# Patient Record
Sex: MALE | Race: WHITE
[De-identification: names, ages, dates, MRNs, and addresses within clinical notes are randomized per-mention and may not be internally consistent; named-entity substitution may affect disease eponyms.]

---

## 2017-12-21 ENCOUNTER — HOSPITAL ENCOUNTER (EMERGENCY)
Dept: HOSPITAL 88 - ER | Age: 32
Discharge: LEFT BEFORE DISCHARGE | End: 2017-12-21
Payer: SELF-PAY

## 2017-12-21 VITALS — WEIGHT: 180 LBS | BODY MASS INDEX: 27.28 KG/M2 | HEIGHT: 68 IN

## 2017-12-21 DIAGNOSIS — G40.409: Primary | ICD-10-CM

## 2017-12-21 LAB
ALBUMIN SERPL-MCNC: 3.9 G/DL (ref 3.5–5)
ALBUMIN/GLOB SERPL: 1 {RATIO} (ref 0.8–2)
ALP SERPL-CCNC: 97 IU/L (ref 40–150)
ALT SERPL-CCNC: 23 IU/L (ref 0–55)
AMPHETAMINES UR QL SCN>1000 NG/ML: NEGATIVE
ANION GAP SERPL CALC-SCNC: 12.1 MMOL/L (ref 8–16)
BACTERIA URNS QL MICRO: (no result) /HPF
BASOPHILS # BLD AUTO: 0 10*3/UL (ref 0–0.1)
BASOPHILS NFR BLD AUTO: 0.2 % (ref 0–1)
BENZODIAZ UR QL SCN: NEGATIVE
BILIRUB UR QL: NEGATIVE
BUN SERPL-MCNC: 8 MG/DL (ref 7–26)
BUN/CREAT SERPL: 9 (ref 6–25)
CALCIUM SERPL-MCNC: 9.5 MG/DL (ref 8.4–10.2)
CANNABINOIDS UR QL SCN: NEGATIVE
CHLORIDE SERPL-SCNC: 106 MMOL/L (ref 98–107)
CLARITY UR: CLEAR
CO2 SERPL-SCNC: 25 MMOL/L (ref 22–29)
COLOR UR: YELLOW
DEPRECATED NEUTROPHILS # BLD AUTO: 4.4 10*3/UL (ref 2.1–6.9)
DEPRECATED RBC URNS MANUAL-ACNC: (no result) /HPF (ref 0–5)
EGFRCR SERPLBLD CKD-EPI 2021: > 60 ML/MIN (ref 60–?)
EOSINOPHIL # BLD AUTO: 0 10*3/UL (ref 0–0.4)
EOSINOPHIL NFR BLD AUTO: 0.5 % (ref 0–6)
EPI CELLS URNS QL MICRO: (no result) /LPF
ERYTHROCYTE [DISTWIDTH] IN CORD BLOOD: 13.5 % (ref 11.7–14.4)
GLOBULIN PLAS-MCNC: 3.8 G/DL (ref 2.3–3.5)
GLUCOSE SERPLBLD-MCNC: 88 MG/DL (ref 74–118)
HCT VFR BLD AUTO: 47.3 % (ref 38.2–49.6)
HGB BLD-MCNC: 15.8 G/DL (ref 14–18)
KETONES UR QL STRIP.AUTO: NEGATIVE
LEUKOCYTE ESTERASE UR QL STRIP.AUTO: NEGATIVE
LYMPHOCYTES # BLD: 2.7 10*3/UL (ref 1–3.2)
LYMPHOCYTES NFR BLD AUTO: 32.4 % (ref 18–39.1)
MCH RBC QN AUTO: 28.4 PG (ref 28–32)
MCHC RBC AUTO-ENTMCNC: 33.4 G/DL (ref 31–35)
MCV RBC AUTO: 84.9 FL (ref 81–99)
MONOCYTES # BLD AUTO: 1.1 10*3/UL (ref 0.2–0.8)
MONOCYTES NFR BLD AUTO: 12.8 % (ref 4.4–11.3)
NEUTS SEG NFR BLD AUTO: 53.9 % (ref 38.7–80)
NITRITE UR QL STRIP.AUTO: NEGATIVE
PCP UR QL SCN: NEGATIVE
PLATELET # BLD AUTO: 276 X10E3/UL (ref 140–360)
POTASSIUM SERPL-SCNC: 4.1 MMOL/L (ref 3.5–5.1)
PROT UR QL STRIP.AUTO: NEGATIVE
RBC # BLD AUTO: 5.57 X10E6/UL (ref 4.3–5.7)
SODIUM SERPL-SCNC: 139 MMOL/L (ref 136–145)
SP GR UR STRIP: 1.01 (ref 1.01–1.02)
UROBILINOGEN UR STRIP-MCNC: 0.2 MG/DL (ref 0.2–1)
VALPROATE SERPL-MCNC: < 2 UG/ML (ref 50–100)
WBC #/AREA URNS HPF: (no result) /HPF (ref 0–5)

## 2017-12-21 PROCEDURE — 72125 CT NECK SPINE W/O DYE: CPT

## 2017-12-21 PROCEDURE — 99284 EMERGENCY DEPT VISIT MOD MDM: CPT

## 2017-12-21 PROCEDURE — 80307 DRUG TEST PRSMV CHEM ANLYZR: CPT

## 2017-12-21 PROCEDURE — 70450 CT HEAD/BRAIN W/O DYE: CPT

## 2017-12-21 PROCEDURE — 36415 COLL VENOUS BLD VENIPUNCTURE: CPT

## 2017-12-21 PROCEDURE — 80053 COMPREHEN METABOLIC PANEL: CPT

## 2017-12-21 PROCEDURE — 85025 COMPLETE CBC W/AUTO DIFF WBC: CPT

## 2017-12-21 PROCEDURE — 81001 URINALYSIS AUTO W/SCOPE: CPT

## 2017-12-21 PROCEDURE — 82542 COL CHROMOTOGRAPHY QUAL/QUAN: CPT

## 2017-12-21 PROCEDURE — 80164 ASSAY DIPROPYLACETIC ACD TOT: CPT

## 2017-12-21 PROCEDURE — 87086 URINE CULTURE/COLONY COUNT: CPT

## 2017-12-21 NOTE — DIAGNOSTIC IMAGING REPORT
History: Fall

Comparison studies: None



Technique: 

Axial images were obtained through the cervical region..

Coronal and sagittal images reconstructed from the axial data..

Intravenous contrast: None



Findings:



Airway: Patent.



Fractures: None.

Soft tissues: No gross abnormalities.



Atlantoaxial articulation: Intact.

Alignment: Normal lordosis. No scoliosis.

Cervicomedullary junction: No abnormalities. The foramen magnum is patent.



Vertebrae: 

No infection or neoplasm.



Degenerative changes: 

None.



IMPRESSION:



1. No acute abnormalities.



2. Ligament, spinal cord and or vascular abnormalities cannot be excluded on

the basis of this examination.





A preliminary report was given by Neuroradiology fellow Dr. Blackburn at 6:14 PM

on 12/21/2017.



I have reviewed the study and agree with the findings in the preliminary

report.



Signed by: Dr. Mary Kim M.D. on 12/21/2017 7:55 PM

## 2017-12-21 NOTE — DIAGNOSTIC IMAGING REPORT
History: Fall

Comparison studies: CT brain from 11/17/2015 and 12/30/2014.





Technique: 

Axial images were obtained from the skull base to the vertex.  

Coronal and sagittal reconstructions obtained from the axial data.



Findings:



Scalp/skull: 

No abnormalities. No fractures, blastic or lytic lesions.



Extra-axial spaces: 

No masses.  No fluid collections.



Brain sulci: Appropriate for age.

Ventricles: Normal in size and configuration. No hydrocephalus.



Parenchyma: 

No abnormal densities. 

No masses, hemorrhage, acute or chronic cortical vascular insults.



Sellar/suprasellar region: No abnormalities

Craniocervical junction: Patent foramen magnum.  No Chiari one malformation.



IMPRESSION:



No intracranial abnormalities.



A preliminary report was given by Neuroradiology fellow Dr. Blackburn at 6:11 PM

on 12/21/2017.



I have reviewed the study and agree with the findings in the preliminary

report.



Signed by: Dr. Mary Kim M.D. on 12/21/2017 7:52 PM

## 2019-07-06 ENCOUNTER — HOSPITAL ENCOUNTER (EMERGENCY)
Dept: HOSPITAL 88 - FSED | Age: 34
Discharge: HOME | End: 2019-07-06
Payer: SELF-PAY

## 2019-07-06 VITALS — BODY MASS INDEX: 26.43 KG/M2 | WEIGHT: 178.44 LBS | HEIGHT: 69 IN

## 2019-07-06 VITALS — DIASTOLIC BLOOD PRESSURE: 74 MMHG | SYSTOLIC BLOOD PRESSURE: 138 MMHG

## 2019-07-06 DIAGNOSIS — F17.210: ICD-10-CM

## 2019-07-06 DIAGNOSIS — R10.13: Primary | ICD-10-CM

## 2019-07-06 DIAGNOSIS — R11.0: ICD-10-CM

## 2019-07-06 PROCEDURE — 80053 COMPREHEN METABOLIC PANEL: CPT

## 2019-07-06 PROCEDURE — 81003 URINALYSIS AUTO W/O SCOPE: CPT

## 2019-07-06 PROCEDURE — 71046 X-RAY EXAM CHEST 2 VIEWS: CPT

## 2019-07-06 PROCEDURE — 85379 FIBRIN DEGRADATION QUANT: CPT

## 2019-07-06 PROCEDURE — 85025 COMPLETE CBC W/AUTO DIFF WBC: CPT

## 2019-07-06 PROCEDURE — 96374 THER/PROPH/DIAG INJ IV PUSH: CPT

## 2019-07-06 PROCEDURE — 93005 ELECTROCARDIOGRAM TRACING: CPT

## 2019-07-06 PROCEDURE — 99284 EMERGENCY DEPT VISIT MOD MDM: CPT

## 2019-07-06 PROCEDURE — 83880 ASSAY OF NATRIURETIC PEPTIDE: CPT

## 2019-07-06 PROCEDURE — 96375 TX/PRO/DX INJ NEW DRUG ADDON: CPT

## 2019-07-06 PROCEDURE — 74177 CT ABD & PELVIS W/CONTRAST: CPT

## 2019-07-06 PROCEDURE — 84484 ASSAY OF TROPONIN QUANT: CPT

## 2019-07-06 NOTE — XMS REPORT
Clinical Summary

                             Created on: 2019



Odilon Arias

External Reference #: [id number]

: 1985

Sex: Male



Demographics







                          Address                   [address]  [address]

 

                          Home Phone                [phone]

 

                          Preferred Language        Unknown

 

                          Marital Status            Single

 

                          Mosque Affiliation     BAP

 

                          Race                      Unknown

 

                          Ethnic Group              Unknown





Author







                          Author                    [facility]

 

                          Organization              [facility]

 

                          Address                   Unknown

 

                          Phone                     Unavailable







Support







                Name            Relationship    Address         Phone

 

                    Max Arias    [employer]                [address]  [address]                    [phone]







Care Team Providers







                    Care Team Member Name    Role                Phone

 

                    Arlene Paez MD     PCP                 [phone]







Allergies







                                        Comments



                 Active Allergy     Reactions       Severity        Noted Date 

 

                                        



Throat swelling



THROAT



                     Dicyclomine         Swelling            2015 

 

                                        



MIGRAINE



                     Caffeine            Other               2015 

 

                                         



                           Ketorolac                 2017 

 

                                        



Patient becomes flushed, has a cough and shortness of breath, and face, neck and
arms become red.



                 Perflutren      Cough, Other     Medium          2016 

 

                                        



MUSCLE SPASMS



                     Ketorolac Tromethamine     Other               2015 







Medications







                          End Date                  Status



              Medication     Sig          Dispensed     Refills      Start  



                                         Date  

 

                                                    Active



                 LORazepam (ATIVAN) 1 mg     Take 1 mg by      0                 



                     tablet              mouth 2 times       7  



                                         daily as     



                                         needed.     

 

                                                    Active



              furosemide (LASIX) 20 mg     Take 1 tablet     90 tablet     1              



                     tabletIndications:     by mouth            8  



                           Combined systolic and     daily.     



                                         diastolic congestive      



                                         heart failure,      



                                         unspecified HF chronicity      

 

                                                    Active



              lisinopril (PRINIVIL,     Take 3       270 tablet     1            10/09/201  



                     ZESTRIL) 5 mg       tablets by          8  



                           tabletIndications:        mouth daily.     



                                         Combined systolic and      



                                         diastolic congestive      



                                         heart failure,      



                                         unspecified HF chronicity      

 

                                                    Active



              divalproex (DEPAKOTE) 250     Take 1 tablet     60 tablet     3              



                     mg delayed release     by mouth 2          9  



                           tabletIndications:        times daily.     



                                         Seizure      

 

                                                    Active



              gabapentin (NEURONTIN)     Take 1       60 capsule     3              



                     300 mg              capsule by          9  



                           capsuleIndications:       mouth 2 times     



                           Seizures                  daily.     

 

                                                    Active



              cetirizine (ZYRTEC) 10 mg     Take 1 tablet     90 tablet     1              



                     tabletIndications: Viral     by mouth            9  



                           upper respiratory tract     daily.     



                                         infection      

 

                                                    Active



              famotidine (PEPCID) 20 mg     Take 1 tablet     180 tablet     1              



                     tabletIndications:     by mouth 2          9  



                           Gastroesophageal reflux     times daily.     



                                         disease without      



                                         esophagitis      

 

                                                    Active



              carvedilol (COREG) 12.5     Take 2       360 tablet     0            /201  



                     mg tabletIndications:     tablets by          9  



                           Combined systolic and     mouth 2 times     



                           diastolic congestive      daily (with     



                           heart failure,            meals).     



                                         unspecified HF chronicity      

 

                                                    Active



              zolpidem (AMBIEN) 5 mg     Take 1 tablet     30 tablet     1              



                     TabIndications: Insomnia,     by mouth            9  



                           unspecified type          nightly at     



                                         bedtime as     



                                         needed for     



                                         Insomnia.     

 

                                                    Active



              traMADol (ULTRAM) 50 mg     TAKE ONE (1)     70 tablet     0              



                     tabletIndications: Pain     TABLET(S) BY        9  



                           in thoracic spine at      MOUTH EVERY     



                           multiple sites            EIGHT HOURS     



                                         AS NEEDED FOR     



                                         PAIN.     

 

                          2018                Discontinued



              gabapentin (NEURONTIN)     Take 1       60 capsule     3            03/15/201  



                     300 mg              capsule by          8  



                           capsuleIndications:       mouth 2 times     



                           Seizures                  daily.     

 

                          2019                Discontinued



              Levetiracetam (KEPPRA)     Take 1 tablet     60 tablet     3            03/15/201  



                     1,000 mg            by mouth 2          8  



                           tabletIndications:        times daily.     



                                         Seizures      

 

                          2019                Discontinued



              famotidine (PEPCID) 20 mg     Take 1 tablet     180 tablet     1            03/15/201  



                     tabletIndications:     by mouth 2          8  



                           Gastroesophageal reflux     times daily.     



                                         disease without      



                                         esophagitis      

 

                          2018                Discontinued



              carvedilol (COREG) 6.25     Take 1 tablet     180 tablet     1            03/15/201  



                     mg tabletIndications:     by mouth 2          8  



                           Combined systolic and     times daily     



                           diastolic congestive      (with meals).     



                                         heart failure,      



                                         unspecified congestive      



                                         heart failure chronicity,      



                                         Sinus tachycardia      

 

                          2019                Discontinued



              hydroCHLOROthiazide     Take 1 tablet     90 tablet     1            03/15/201  



                     (HYDRODIURIL) 25 mg     by mouth            8  



                           tabletIndications:        daily as     



                           Combined systolic and     needed for     



                           diastolic congestive      Other (fluid     



                           heart failure,            retention).     



                                         unspecified congestive      



                                         heart failure chronicity,      



                                         Sinus tachycardia      

 

                          2018                Discontinued



              lisinopril (PRINIVIL) 10     Take 1 tablet     90 tablet     1            03/15/201  



                     mg tabletIndications:     by mouth            8  



                           Combined systolic and     daily.     



                                         diastolic congestive      



                                         heart failure,      



                                         unspecified congestive      



                                         heart failure chronicity,      



                                         Sinus tachycardia      

 

                          10/09/2018                Discontinued



              divalproex (DEPAKOTE) 250     Take 1 tablet     60 tablet     3              



                     mg delayed release     by mouth 2          8  



                           tabletIndications:        times daily.     



                                         Seizure      

 

                          2018                Discontinued



              traMADol (ULTRAM) 50 mg     Take 1 tablet     60 tablet     0              



                     tabletIndications: Pain     by mouth            8  



                           in thoracic spine at      daily as     



                           multiple sites            needed for     



                                         Pain.     

 

                          10/09/2018                Discontinued



              lisinopril (PRINIVIL) 10     Take 1 tablet     90 tablet     1              



                     mg tabletIndications:     by mouth            8  



                           Sinus tachycardia         daily.     

 

                          10/09/2018                Discontinued



              carvedilol (COREG) 25 mg     Take 1 tablet     180 tablet     1              



                     tabletIndications:     by mouth 2          8  



                           Combined systolic and     times daily     



                           diastolic congestive      (with meals).     



                                         heart failure,      



                                         unspecified HF chronicity      

 

                          2018                Discontinued



              traMADol (ULTRAM) 50 mg     Take 1 tablet     60 tablet     1              



                     tabletIndications: Pain     by mouth            8  



                           in thoracic spine at      every 12     



                           multiple sites            hours.     

 

                          10/09/2018                Discontinued



              zolpidem (AMBIEN) 5 mg     Take 1 tablet     30 tablet     1              



                     TabIndications: Insomnia,     by mouth            8  



                           unspecified type          nightly at     



                                         bedtime as     



                                         needed for     



                                         Insomnia.     

 

                          2019                Discontinued



              gabapentin (NEURONTIN)     Take 1       60 capsule     3              



                     300 mg              capsule by          8  



                           capsuleIndications:       mouth 2 times     



                           Seizures                  daily.     

 

                          2018                Discontinued



              traMADol (ULTRAM) 50 mg     Take 1 tablet     60 tablet     1              



                     tabletIndications: Pain     by mouth            8  



                           in thoracic spine at      every 12     



                           multiple sites            hours.     

 

                          10/09/2018                Discontinued



              traMADol (ULTRAM) 50 mg     Take 1 tablet     60 tablet     1              



                     tabletIndications: Pain     by mouth            8  



                           in thoracic spine at      every 12     



                           multiple sites            hours.     

 

                          10/09/2018                Discontinued



              carvedilol (COREG) 12.5     Take 2       180 tablet     1            10/09/201  



                     mg tabletIndications:     tablets by          8  



                           Combined systolic and     mouth 2 times     



                           diastolic congestive      daily (with     



                           heart failure,            meals).     



                                         unspecified HF chronicity      

 

                          10/09/2018                Discontinued



              lisinopril (PRINIVIL,     Take 3       270 tablet     1            10/09/201  



                     ZESTRIL) 5 mg       tablets by          8  



                           tabletIndications:        mouth daily.     



                                         Combined systolic and      



                                         diastolic congestive      



                                         heart failure,      



                                         unspecified HF chronicity      

 

                          10/09/2018                Discontinued



              zolpidem (AMBIEN) 5 mg     Take 1 tablet     30 tablet     1            10/09/201  



                     TabIndications: Insomnia,     by mouth            8  



                           unspecified type          nightly at     



                                         bedtime as     



                                         needed for     



                                         Insomnia.     

 

                          10/09/2018                Discontinued



              divalproex (DEPAKOTE) 250     Take 1 tablet     60 tablet     3            10/09/201  



                     mg delayed release     by mouth 2          8  



                           tabletIndications:        times daily.     



                                         Seizure      

 

                          10/09/2018                Discontinued



              traMADol (ULTRAM) 50 mg     Take 1 tablet     60 tablet     1            10/09/201  



                     tabletIndications: Pain     by mouth            8  



                           in thoracic spine at      every 12     



                           multiple sites            hours.     

 

                          2018                Discontinued



              carvedilol (COREG) 12.5     Take 2       180 tablet     1            10/09/201  



                     mg tabletIndications:     tablets by          8  



                           Combined systolic and     mouth 2 times     



                           diastolic congestive      daily (with     



                           heart failure,            meals).     



                                         unspecified HF chronicity      

 

                          2018                Discontinued



              traMADol (ULTRAM) 50 mg     Take 1 tablet     60 tablet     1            10/09/201  



                     tabletIndications: Pain     by mouth            8  



                           in thoracic spine at      every 12     



                           multiple sites            hours.     

 

                          2019                Discontinued



              divalproex (DEPAKOTE) 250     Take 1 tablet     60 tablet     3            10/09/201  



                     mg delayed release     by mouth 2          8  



                           tabletIndications:        times daily.     



                                         Seizure      

 

                          2018                Discontinued



              zolpidem (AMBIEN) 5 mg     Take 1 tablet     30 tablet     1            10/09/201  



                     TabIndications: Insomnia,     by mouth            8  



                           unspecified type          nightly at     



                                         bedtime as     



                                         needed for     



                                         Insomnia.     

 

                          2019                Discontinued



              carvedilol (COREG) 12.5     Take 2       180 tablet     1              



                     mg tabletIndications:     tablets by          8  



                           Combined systolic and     mouth 2 times     



                           diastolic congestive      daily (with     



                           heart failure,            meals).     



                                         unspecified HF chronicity      

 

                          2019                Discontinued



              traMADol (ULTRAM) 50 mg     Take 1 tablet     60 tablet     1              



                     tabletIndications: Pain     by mouth            8  



                           in thoracic spine at      every 12     



                           multiple sites            hours.     

 

                          2019                Discontinued



              zolpidem (AMBIEN) 5 mg     Take 1 tablet     30 tablet     1              



                     TabIndications: Insomnia,     by mouth            9  



                           unspecified type          nightly at     



                                         bedtime as     



                                         needed for     



                                         Insomnia.     

 

                          2019                Discontinued



              traMADol (ULTRAM) 50 mg     Take 1 tablet     70 tablet     1              



                     tabletIndications: Pain     by mouth            9  



                           in thoracic spine at      every 8 hours     



                           multiple sites            as needed for     



                                         Pain.     

 

                          2019                Discontinued



              traMADol (ULTRAM) 50 mg     TAKE ONE (1)     70 tablet     0            04/11/201  



                     tabletIndications: Pain     TABLET(S) BY        9  



                           in thoracic spine at      MOUTH EVERY     



                           multiple sites            EIGHT HOURS     



                                         AS NEEDED FOR     



                                         PAIN.     

 

                          2019                Discontinued



              traMADol (ULTRAM) 50 mg     TAKE ONE (1)     70 tablet     0              



                     tabletIndications: Pain     TABLET(S) BY        9  



                           in thoracic spine at      MOUTH EVERY     



                           multiple sites            EIGHT HOURS     



                                         AS NEEDED FOR     



                                         PAIN.     







Active Problems







 



                           Problem                   Noted Date

 

 



                           Seizure                   2017

 

 



                           Convulsions               2017

 

 



                           Moderate episode of recurrent major depressive disorder     2016

 

 



                           Chest pain                2016

 

 



                           SOB (shortness of breath)     2016

 

 



                           Sinus tachycardia         2016

 

 



                           Dehydration               2016

 

 



                           RUQ abdominal pain        2016

 

 



                           Left wrist pain           2016

 

 



                           Back pain                 2016

 

 



                           Left hand pain            10/26/2015

 

 



                           History of substance abuse- MJ as teenager ; quit at age 18 yrs     08/10/2015

 

 



                           History of ADHD           08/10/2015

 

 



                           Testicular/scrotal pain     08/10/2015

 

 



                           Testicular pain           2015

 

 



                           S/P colonoscopy-repeat surveillance in 10yrs     2015

 

 



                           Shoulder pain, right      2015

 

 



                           Left inguinal pain        2015

 

 



                           Abdominal pain            2015

 

 



                           Wrist pain                2015

 

 



                           Right shoulder pain       2015

 

 



                           Headache                  04/15/2015

 

 



                           History of depression     2015

 

 



                           Smoking- smokes 1/2 pk per day - previously 1 pk per day - states cutting     2015





                                         back as of 2015 

 

 



                           RLQ abdominal pain        2015

 

 



                           Hx of appendectomy        2015







Encounters







                          Care Team                 Description



                     Date                Type                Specialty  

 

                                        



Arlene Paez MD                        No Show



                           2019                Hospital   



                                         Encounter   

 

                                        



Arlene Paez MD                        Pain in thoracic spine at multiple sites



                     2019          Refill              Family Practice  

 

                                        



Arlene Paez MD                        Combined systolic and diastolic congestive heart failure, unspecified

 HF chronicity (Primary Dx); 

Heart palpitations; 

SOB (shortness of breath); 

Seizure; 

Pain in thoracic spine at multiple sites



                     2019          Office Visit        Family Practice  

 

                                        



Arlene Paez MD                        Heart palpitations



                     2019          Orders Only         Family Practice  

 

                                                     



                           2019                Travel   

 

                                        



Ahmet Mcnamara                        Medications (refill)



                     2019          Telephone           Family Practice  

 

                                        



Arlene Paez MD                        Pain in thoracic spine at multiple sites



                     2019          Refill              [facility]  

 

                                        



Arlene Paez MD                        Combined systolic and diastolic congestive heart failure, unspecified

 HF chronicity; 

Insomnia, unspecified type



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 

 

    



                 Eos             2.8             0.8 - 5.0 %     [facility] 



                                         2 

 

    



                 Basos           0.7             0.2 - 1.2 %     [facility] 



                                         2 

 

    



                 Immature        0.5             0.0 - 0.5       [facility] 



                           Granulocytes              2 

 

    



                 Neutrophils     4.59            1.78 - 5.36 K/uL     [facility] 



                           (Absolute)                2 

 

    



                 Lymphs          2.88            1.32 - 3.57 K/uL     [facility] 



                           (Absolute)                2 

 

    



                 Monocytes(Absol     0.86 (H)        0.30 - 0.82 K/uL     [facility] 



                           shan)                      2 

 

    



                 Eos (Absolute)     0.24            0.04 - 0.54 K/uL     [facility] 



                                         2 

 

    



                 Baso (Absolute)     0.06            0.01 - 0.08 K/uL     [facility] 



                                         2 

 

    



                 Immature Grans     0.04 (H)        0.00 - 0.03 K/uL     [facility] 



                           (Abs)                     2 













                                         Specimen

 





                                         Blood









   



                 Performing Organization     Address         City/[facility]     Phone Number

 

   



                                         [facility]   

 

   



                                          [facility]   





* BASIC METABOLIC PANEL (2019  9:42 AM CST)



Only the most recent of 2 results within the time period is included.





    



              Component     Value        Ref Range     Performed At     Pathologist



                                         [facility]

 

    



                 CO2             29              21 - 31 mmol/L     [facility] 



                                         1 

 

    



                 Chloride        103             98 - 107 mmol/L     [facility] 



                                         1 

 

    



                 Potassium       4.7             3.5 - 5.1 mmol/L     [facility] 



                                         1 

 

    



                 Sodium          140             136 - 145 mmol/L     [facility] 



                                         1 

 

    



                 Glucose         92              70 - 110 mg/dL     [facility] 



                                         1 

 

    



                 BUN             10              7 - 25 mg/dL     [facility] 



                                         1 

 

    



                 Creatinine      1.00            0.7 - 1.3 mg/dL     [facility] 



                                         1 

 

    



                     Anion Gap           8                   [facility] 



                                         1 

 

    



                 Calcium         10.2            8.6 - 10.3 mg/dL     [facility] 



                                         1 

 

    



                 GFR, Estimated     >60             mL/min/1.73 m2     [facility] 



                                         1 

 

    



                 eGFR If Africn     >60             mL/min/1.73 m2     [facility] 



                           Am                        1 













                                         Specimen

 





                                         Blood









   



                 Performing Organization     Address         City/[facility]     Phone Number

 

   



                                         [facility]   

 

   



                                          [facility]   





* TRANSTHORACIC ECHO (TTE) (2018  1:34 PM CDT)





    



              Component     Value        Ref Range     Performed At     Pathologist



                                         [facility]

 

    



                     TRANSTHORACIC       Transthoracic       SMS 



                           ECHO (TTE)                Echo Report   



                                         ODILON ARIAS   



                                         Age:32   



                                         Gender: M   



                                         :1985   



                                         MRN:[id number]   



                                         Exam Date:   



                                         2018   



                                            



                                         13:34   



                                         Exam Location: [facility] Echo   



                                         Ordering Phys:   



                                         ARLENE PAEZ   



                                         Referring   



                                         Phys:ARLENE PAEZ   



                                         Reading   



                                         Phys:Tuan Farias MD   



                                         Fellow Phys:   



                                         Micky Espana M.D.   



                                         Fellow Phys:   



                                         Sonographer:   



                                         Shama Samano   



                                         Reason For Exam:   



                                         Indications:   



                                         worsening symptoms of SOB,   



                                         NYHA   



                                            



                                          III,   



                                         tachycardia   



                                         ICD-9 Codes:   



                                         Exam Type:   



                                         TRANSTHORACIC ECHO (TTE)   



                                         Procedure   



                                         CPT:29822   



                                         Addtional CPT:   



                                         Ht (in):   



                                         69 BSA:   



                                         1.95HR:   



                                         104   



                                         Rhythm: Sinus rhythm   



                                         Wt (lb):   



                                         171BP:   



                                         137/ 93   



                                         Technical   



                                         Quality: Good   



                                         History:   



                                         MEASUREMENTS(Male /   



                                         Female) Normal Values   



                                         2D ECHO   



                                         Body Surface   



                                         Area   



                                          2   



                                         m   



                                            



                                         LV Diastolic Diameter   



                                         PLAX4.7   



                                         cm   



                                         4.2 - 5.9 / 3.9 - 5.3 cm   



                                         LV Systolic Diameter   



                                         PLAX 3.2   



                                         cm   



                                         2.1 - 4.0 cm   



                                         LV Fractional Shortening   



                                         PLAX 32.1   



                                         %   



                                         25 - 46%   



                                         IVS Diastolic   



                                         Thickness   



                                         1.1   



                                         cm   



                                            



                                         IVS Systolic   



                                         Thickness   



                                         1.5   



                                         cm   



                                            



                                         LVPW Diastolic   



                                         Thickness1   



                                         .1   



                                         cm   



                                            



                                         LVPW Systolic   



                                         Thickness   



                                         1.4   



                                         cm   



                                            



                                         LV Relative Wall   



                                         Thickness0.46   



                                            



                                            



                                         LVOT   



                                         Diameter   



                                          2.1   



                                         cm   



                                            



                                         Aortic Root   



                                         Diameter   



                                         3.4   



                                         cm   



                                            



                                         LV Diastolic Volume MOD   



                                         BP93.7   



                                         cm   



                                         67 - 155 / 56 - 104 cm   



                                         LV Systolic Volume MOD   



                                         BP 43.7   



                                         cm   



                                         22 - 58 / 19 - 49 cm   



                                         LV Ejection Fraction MOD   



                                         BP 53.4   



                                         %   



                                         >=55%   



                                         LV Stroke Volume MOD   



                                         BP 50   



                                         cm   



                                            



                                         LV Cardiac Output MOD   



                                         [id number]   



                                         cm/min   



                                         LV Cardiac Index MOD   



                                         BP 2662   



                                         cm/minm   



                                         LA   



                                         Volume   



                                          44.7   



                                         cm   



                                         18 - 58 / 22 - 52 cm   



                                         LA Volume   



                                         Index   



                                          22.9   



                                         cm/m   



                                         16 - 28 cm/m   



                                         DOPPLER   



                                         AV Peak   



                                         Velocity   



                                         109   



                                         cm/s   



                                            



                                         AV Peak   



                                         Gradient   



                                         4.7   



                                         mmHg   



                                            



                                         AV Mean   



                                         Velocity   



                                         70   



                                         cm/s   



                                            



                                         AV Mean   



                                         Gradient   



                                         2.3   



                                         mmHg   



                                            



                                         AV Velocity Time   



                                         Integral 17.8   



                                         cm   



                                            



                                         LVOT Peak   



                                         Velocity   



                                         83   



                                         cm/s   



                                            



                                         LVOT Peak   



                                         Gradient   



                                         2.8   



                                         mmHg   



                                            



                                         LVOT Mean   



                                         Velocity   



                                         58.9   



                                         cm/s   



                                            



                                         LVOT Mean   



                                         Gradient   



                                         1.6   



                                         mmHg   



                                            



                                         LVOT Velocity Time   



                                         Integral 14.7   



                                         cm   



                                            



                                         LVOT Stroke   



                                         Volume   



                                         52.5   



                                         cm   



                                            



                                         AV Area Cont Eq   



                                         vti   



                                          3   



                                         cm   



                                            



                                         AV Area Cont Eq   



                                         pk   



                                         2.7   



                                         cm   



                                            



                                         Mitral E Point   



                                         Velocity   



                                         62   



                                         cm/s   



                                            



                                         Mitral A Point   



                                         Velocity   



                                         54   



                                         cm/s   



                                            



                                         Mitral E to A   



                                         Ratio   



                                            



                                         1.1   



                                            



                                         MV Deceleration   



                                         Stephenson   



                                         285   



                                         cm/s   



                                            



                                         MV Deceleration   



                                         Time   



                                         217   



                                         ms   



                                            



                                         PV Peak   



                                         Velocity   



                                         93.5   



                                         cm/s   



                                            



                                         PV Peak   



                                         Gradient   



                                         3.5   



                                         mmHg   



                                            



                                         RVOT Peak   



                                         Velocity   



                                         69.4   



                                         cm/s   



                                            



                                         RVOT Peak   



                                         Gradient   



                                         1.9   



                                         mmHg   



                                            



                                         LV E' Lateral   



                                         Velocity   



                                         9.2   



                                         cm/s   



                                            



                                         Mitral E to LV E' Lateral   



                                         Ratio   



                                         6.8   



                                            



                                         LV E' Septal   



                                         Velocity   



                                          7   



                                         cm/s   



                                            



                                         Mitral E to LV E' Septal   



                                         Ratio8.8   



                                            



                                         FINDINGS   



                                         Left Ventricle   



                                         The left ventricle is normal   



                                         in size. Upper-normal LV wall   



                                         thickness. LV   



                                         systolic function is low   



                                         normal. LVEF is 50-54%. There   



                                         are no regional wall   



                                         motion   



                                         abnormalities.There is   



                                         normal LV relaxation with   



                                         normal filling   



                                         pressures.   



                                         Right Ventricle   



                                         The right ventricle is normal   



                                         in size and systolic function.   



                                         TAPSE=1.8 cm.   



                                         Right Atrium   



                                         The right atrium is normal in   



                                         size.   



                                         Left Atrium   



                                         The left atrium is normal in   



                                         size.   



                                         IAS   



                                         Mitral Valve   



                                         Structurally normal mitral   



                                         valve without significant   



                                         stenosis or prolapse.   



                                         There is no mitral   



                                         regurgitation.   



                                         Aortic Valve   



                                         The aortic valve is   



                                         trileaflet and structurally   



                                         normal. There is no aortic   



                                         stenosis. There is no aortic   



                                         regurgitation.   



                                         Tricuspid Valve   



                                         Structurally normal tricuspid   



                                         valve without significant   



                                         stenosis. There is   



                                         trace tricuspid   



                                         regurgitation.Insufficient   



                                         TR jet to estimate   



                                         pulmonary   



                                         artery systolic pressure.   



                                         Pulmonic Valve   



                                         Structurally normal pulmonic   



                                         valve without significant   



                                         stenosis. There is trace   



                                         pulmonic regurgitation.   



                                         Pericardium   



                                         No pericardial effusion.   



                                         Aorta   



                                         Normal aortic root for body   



                                         surface area.   



                                         IVC   



                                         The inferior vena cava is   



                                         normal in size.   



                                         CONCLUSIONS   



                                         The left ventricle is normal   



                                         in size. Upper-normal LV wall   



                                         thickness.   



                                         LV systolic function is low   



                                         normal. LVEF is 50-54%.   



                                         There are no regional wall   



                                         motion abnormalities.   



                                         There is normal LV relaxation   



                                         with normal filling pressures.

   



                                         The right ventricle is   



                                         normal in size and systolic   



                                         function.   



                                         Atria are normal size.   



                                         No significant valvular   



                                         abnormalities.   



                                         Insufficient TR jet to   



                                         estimate pulmonary artery   



                                         systolic pressure.   



                                         No pericardial effusion.   



                                         When compared to prior study   



                                         from 2016, the LVEF is   



                                         improved, no regional   



                                         wall motion abnormalities are   



                                         identified   



                                         Tuan Farias MD

   



                                         (Electronically Signed)   



                                         Final Date:2018   



                                            



                                         17:11   



                                         2D ECHO   



                                         Body Surface   



                                         Area   



                                          2   



                                         m   



                                            



                                         LV Diastolic Diameter   



                                         PLAX4.7   



                                         cm   



                                         4.2 - 5.9 / 3.9 - 5.3 cm   



                                         LV Systolic Diameter   



                                         PLAX 3.2   



                                         cm   



                                         2.1 - 4.0 cm   



                                         LV Fractional Shortening   



                                         PLAX 32.1   



                                         %   



                                         25 - 46%   



                                         IVS Diastolic   



                                         Thickness   



                                         1.1   



                                         cm   



                                            



                                         IVS Systolic   



                                         Thickness   



                                         1.5   



                                         cm   



                                            



                                         LVPW Diastolic   



                                         Thickness1   



                                         .1   



                                         cm   



                                            



                                         LVPW Systolic   



                                         Thickness   



                                         1.4   



                                         cm   



                                            



                                         LV Relative Wall   



                                         Thickness0.46   



                                            



                                            



                                         LVOT   



                                         Diameter   



                                          2.1   



                                         cm   



                                            



                                         Aortic Root   



                                         Diameter   



                                         3.4   



                                         cm   



                                            



                                         LV Diastolic Volume MOD   



                                         BP93.7   



                                         cm   



                                         67 - 155 / 56 - 104 cm   



                                         LV Systolic Volume MOD   



                                         BP 43.7   



                                         cm   



                                         22 - 58 / 19 - 49 cm   



                                         LV Ejection Fraction MOD   



                                         BP 53.4   



                                         %   



                                         >=55%   



                                         LV Stroke Volume MOD   



                                         BP 50   



                                         cm   



                                            



                                         LV Cardiac Output MOD   



                                         [id number]   



                                         cm/min   



                                         LV Cardiac Index MOD   



                                         BP 2662   



                                         cm/minm   



                                         LA   



                                         Volume   



                                          44.7   



                                         cm   



                                         18 - 58 / 22 - 52 cm   



                                         LA Volume   



                                         Index   



                                          22.9   



                                         cm/m   



                                         16 - 28 cm/m   



                                         DOPPLER   



                                         AV Peak   



                                         Velocity   



                                         109   



                                         cm/s   



                                            



                                         AV Peak   



                                         Gradient   



                                         4.7   



                                         mmHg   



                                            



                                         AV Mean   



                                         Velocity   



                                         70   



                                         cm/s   



                                            



                                         AV Mean   



                                         Gradient   



                                         2.3   



                                         mmHg   



                                            



                                         AV Velocity Time   



                                         Integral 17.8   



                                         cm   



                                            



                                         LVOT Peak   



                                         Velocity   



                                         83   



                                         cm/s   



                                            



                                         LVOT Peak   



                                         Gradient   



                                         2.8   



                                         mmHg   



                                            



                                         LVOT Mean   



                                         Velocity   



                                         58.9   



                                         cm/s   



                                            



                                         LVOT Mean   



                                         Gradient   



                                         1.6   



                                         mmHg   



                                            



                                         LVOT Velocity Time   



                                         Integral 14.7   



                                         cm   



                                            



                                         LVOT Stroke   



                                         Volume   



                                         52.5   



                                         cm   



                                            



                                         AV Area Cont Eq   



                                         vti   



                                          3   



                                         cm   



                                            



                                         AV Area Cont Eq   



                                         pk   



                                         2.7   



                                         cm   



                                            



                                         Mitral E Point   



                                         Velocity   



                                         62   



                                         cm/s   



                                            



                                         Mitral A Point   



                                         Velocity   



                                         54   



                                         cm/s   



                                            



                                         Mitral E to A   



                                         Ratio   



                                            



                                         1.1   



                                            



                                         MV Deceleration   



                                         Stephenson   



                                         285   



                                         cm/s   



                                            



                                         MV Deceleration   



                                         Time   



                                         217   



                                         ms   



                                            



                                         PV Peak   



                                         Velocity   



                                         93.5   



                                         cm/s   



                                            



                                         PV Peak   



                                         Gradient   



                                         3.5   



                                         mmHg   



                                            



                                         RVOT Peak   



                                         Velocity   



                                         69.4   



                                         cm/s   



                                            



                                         RVOT Peak   



                                         Gradient   



                                         1.9   



                                         mmHg   



                                            



                                         LV E' Lateral   



                                         Velocity   



                                         9.2   



                                         cm/s   



                                            



                                         Mitral E to LV E' Lateral   



                                         Ratio   



                                         6.8   



                                            



                                         LV E' Septal   



                                         Velocity   



                                          7   



                                         cm/s   



                                            



                                         Mitral E to LV E' Septal   



                                         Ratio8.8   



                                            













                                         Specimen

 











   



                 Performing Organization     Address         City/[facility]     Phone Number

 

   



                                         [facility]   





* LIPID PROFILE (2018 12:58 PM CDT)





    



              Component     Value        Ref Range     Performed At     Pathologist



                                         Signature

 

    



                 Cholesterol     184             mg/dL           [facility] 



                           Comment:                  1 



                                         REFERENCE RANGE:   



                                         Desirable: <200 mg/dL   



                                         Borderline: 200-240 mg/dL   



                                         High Risk: >240 mg/dL   

 

    



                 Triglyceride     70              <150 mg/dL      [facility] 



                           Comment:                  1 



                                         REFERENCE RANGE:   



                                         Normal: <150 mg/dL   



                                         Borderline High: 150-199 mg/dL   



                                         High: 200-499 mg/dL   



                                         Very High: >yu=167 mg/dL   

 

    



                 HDL             51              mg/dL           [facility] 



                           Comment:                  1 



                                         Increased CHD risk: <40 mg/dL   



                                         Decreased CHD risk: >60 mg/dL   

 

    



                 LDL             119             mg/dL           [facility] 



                           Comment:                  1 



                                         REFERENCE RANGE:   



                                         Optimal: <100 mg/dL   



                                         Near Optimal: 100-129 mg/dL   



                                         Borderline High: 130-159 mg/dL   



                                         High: 160-189 mg/dL   



                                         Very High: >jt=827 mg/dL   













                                         Specimen

 





                                         Blood









   



                 Performing Organization     Address         City/[facility]     Phone Number

 

   



                                         [facility]   

 

   



                                         [facility]   





* 12 LEAD EKG (2018 10:56 AM CDT)





    



              Component     Value        Ref Range     Performed At     Pathologist



                                         Signature

 

    



                     12 LEAD EKG FOR           [facility]   



                                            



                                            



                                            



                                            



                                            



                                          Test   



                                         Date:2018   



                                         Pat Name: ODILON ARIAS   



                                         Department:   



                                         Patient ID:   



                                         [id number]   



                                         Room:   



                                            



                                         Gender:   



                                         M   



                                         Technician:   



                                          [id number]   



                                         :1985   



                                          Requested By:   



                                         Order   



                                         Number:   



                                         NIGEL munoz MD: Lucas Santiago M.D.   



                                            



                                            



                                          Measurements   



                                         Intervals   



                                            



                                         Axis   



                                            



                                         Rate:   



                                         92   



                                            



                                         P:65   



                                         AK:   



                                         128   



                                         QRS:   



                                         78   



                                         QRSD:   



                                         74   



                                            



                                         T:49   



                                         QT:   



                                         334   



                                            



                                            



                                         QTc:413   



                                            



                                            



                                            



                                            



                                            



                                         Interpretive Statements   



                                         Normal sinus rhythm   



                                         Normal ECG   



                                         Electronically Signed On   



                                         18 17:05:02 CDT by   



                                         Lucas Santiago M.D.   













                                         Specimen

 











   



                 Performing Organization     Address         City/State/Zipcode     Phone Number

 

   



                                         [facility]   





after 06/10/2018



Insurance







                                        Type



            Payer      Benefit     Subscriber ID     Effective     Phone      Address 



                           Plan /                    Dates   



                                         Group     

 

                                         



            [facility]      xxxxxxx     2019-4     [phone]     [address] 



                 SELF            FAMILY          /2020         [id number] 



                           PLANNING                  [place] 



                           SELF                      [id number] 

 

                                         



            [facility] SELF-PAY     SELF-PAY     xxxxxxx     2019-3     [phone]     [address] [facility]            /            [place] [address] 









     



            Guarantor Name     Account     Relation to     Date of     Phone      Billing Address



                     Type                Patient             Birth  

 

     



            Odilon Arias     Personal/F     Self       1985     [phone]     [address]               ([place])              [place] [address]

## 2019-07-06 NOTE — XMS REPORT
Clinical Summary

                             Created on: 2018



[place]

External Reference #: [id number]

: 1985

Sex: Male



Demographics







                          Address                   [address]  [address]

 

                          Home Phone                [phone]

 

                          Preferred Language        Unknown

 

                          Marital Status            Single

 

                          Denominational Affiliation     BAP

 

                          Race                      Unknown

 

                          Ethnic Group              Unknown





Author







                          Author                    [facility]

 

                          Organization              [facility]

 

                          Address                   Unknown

 

                          Phone                     Unavailable







Support







                Name            Relationship    Address         Phone

 

                    Pamela Arias       [employer]                [address]  [address]                    [phone]







Care Team Providers







                    Care Team Member Name    Role                Phone

 

                    Arlene Paez MD     PCP                 [phone]







Allergies







    



              Active Allergy     Reactions     Severity     Noted Date     Comments

 

    



                 Dicyclomine     Swelling        2015      Throat swelling



                                         THROAT

 

    



                 Caffeine        Other           2015      MIGRAINE

 

    



              Codeine      Itching, Other     High         2015     rash

 

    



                           Ketorolac                 2017 

 

    



              Perflutren     Cough, Other     Medium       2016     Patient becomes flushed,



                                         has a cough and shortness



                                         of breath, and face, neck



                                         and arms become red.

 

    



                 Ketorolac Tromethamine     Other           2015      MUSCLE SPASMS







Current Medications







      



           Prescription     Sig.      Disp.     Refills     Start     End Date     Status



                                         Date  

 

      



            Levetiracetam (KEPPRA)     Take 1 tablet by mouth 2     60 tablet     3          03/15/20     

 Active



                     1,000 mg            times daily.        18  



                                         tabletIndications:      



                                         Seizures      

 

      



              famotidine (PEPCID) 20 mg     Take 1 tablet by mouth 2     180 tablet     1            03/15/20

                                         Active



                     tabletIndications:     times daily.        18  



                                         Gastroesophageal reflux      



                                         disease without      



                                         esophagitis      

 

      



            hydroCHLOROthiazide     Take 1 tablet by mouth     90 tablet     1          03/15/20      Active





                     (HYDRODIURIL) 25 mg     daily as needed for Other       18  



                           tabletIndications:        (fluid retention).     



                                         Combined systolic and      



                                         diastolic congestive      



                                         heart failure,      



                                         unspecified congestive      



                                         heart failure chronicity,      



                                         Sinus tachycardia      

 

      



              LORazepam (ATIVAN) 1 mg     Take 1 mg by mouth 2      0            20      Active



                     tablet              times daily as needed.       17  

 

      



            furosemide (LASIX) 20 mg     Take 1 tablet by mouth     90 tablet     1          20     

 Active



                     tabletIndications:     daily.              18  



                                         Combined systolic and      



                                         diastolic congestive      



                                         heart failure,      



                                         unspecified HF chronicity      

 

      



              gabapentin (NEURONTIN)     Take 1 capsule by mouth 2     60 capsule     3            20 

                                         Active



                     300 mg              times daily.        18  



                                         capsuleIndications:      



                                         Seizures      

 

      



              carvedilol (COREG) 12.5     Take 2 tablets by mouth 2     180 tablet     1            10/09/20

                                         Active



                     mg tabletIndications:     times daily (with meals).       18  



                                         Combined systolic and      



                                         diastolic congestive      



                                         heart failure,      



                                         unspecified HF chronicity      

 

      



            traMADol (ULTRAM) 50 mg     Take 1 tablet by mouth     60 tablet     1          10/09/20      

Active



                     tabletIndications: Pain     every 12 hours.       18  



                                         in thoracic spine at      



                                         multiple sites      

 

      



              divalproex (DEPAKOTE) 250     Take 1 tablet by mouth 2     60 tablet     3            10/09/20

                                         Active



                     mg delayed release     times daily.        18  



                                         tabletIndications:      



                                         Seizure      

 

      



            zolpidem (AMBIEN) 5 mg     Take 1 tablet by mouth     30 tablet     1          10/09/20      Active





                     TabIndications: Insomnia,     nightly at bedtime as       18  



                           unspecified type          needed for Insomnia.     

 

      



            lisinopril (PRINIVIL,     Take 3 tablets by mouth     270 tablet     1          10/09/20      

Active



                     ZESTRIL) 5 mg       daily.              18  



                                         tabletIndications:      



                                         Combined systolic and      



                                         diastolic congestive      



                                         heart failure,      



                                         unspecified HF chronicity      

 

      



            sennosides (SENOKOT) 8.6     Take 1 tablet by mouth     90 tablet     3          04/01/20     

03/15/20                                 Discontin



              mg tabletIndications:     daily.       15           18           ued



                                         Constipation      

 

      



            Docusate Sodium 100 mg     Take by mouth 2 times     90 tablet     3          04/01/20     03/15/20

                                         Discontin



              TabIndications:     daily.       15           18           ued



                                         Constipation      

 

      



                 NAPROXEN SODIUM (ALEVE     Take by mouth.        03/15/20        Discontin



                     OR)                 18                  ued

 

      



            diclofenac sodium 75 mg     Take 1 tablet by mouth 2     30 tablet     0          11/13/20    

 03/15/20                                Discontin



              delayed release     times daily as needed for       15           18           ued



                           tabletIndications: Left     Pain.     



                                         wrist pain      

 

      



            acetaminophen (TYLENOL)     Take 1 tablet by mouth     30 tablet     0          12/12/20     03/15/20

                                         Discontin



              500 mg tabletIndications:     every 6 hours as needed       15           18           ued



                           Lower abdominal pain      for Pain.     

 

      



            ibuprofen (MOTRIN) 800 mg     Take 1 tablet by mouth     60 tablet     0          01/13/20    

 03/15/20                                Discontin



              tabletIndications: Left     every 8 hours as needed       16           18           ued



                           wrist pain                for Pain.     

 

      



            sucralfate (CARAFATE) 100     Take 10 mL by mouth 4     420 mL     0          02/03/20     03/15/20

                                         Discontin



              mg/mL oral     times daily (before meals       16           18           ued



                           suspensionIndications:     and nightly).     



                                         Right upper quadrant      



                                         abdominal pain      

 

      



            loperamide (SOBA     Take 1 capsule by mouth 4     15 tablet     0          03/05/20     03/15/20

                                         Discontin



              ANTI-DIARRHEAL) 2 mg     times daily as needed for       16           18           ued



                           capsuleIndications: Loose     Diarrhea.     



                                         stools      

 

      



              mirtazapine (REMERON) 15     Take 1 tablet by mouth at     30 tablet     2            10/26/20

                           03/15/20                  Discontin



              mg tabletIndications:     bedtime nightly.       16           18           ued



                                         Moderate episode of      



                                         recurrent major      



                                         depressive disorder      

 

      



            lisinopril (PRINIVIL) 10     Take 1 tablet by mouth     30 tablet     6          10/26/20     

03/15/20                                 Discontin



              mg tabletIndications:     daily.       16           18           ued



                                         Chronic systolic      



                                         congestive heart failure      

 

      



            traMADol (ULTRAM) 50 mg     Take 1 tablet by mouth     60 tablet     1          11/14/20     03/15/20

                                         Discontin



              tabletIndications: RLQ     every 8 hours as needed       16           18           ued



                           abdominal pain            for Pain.     

 

      



              carvedilol (COREG) 6.25     Take 1 tablet by mouth 2     180 tablet     1            11/14/20 

                           03/15/20                  Discontin



              mg tabletIndications:     times daily (with meals).       16           18           ued



                                         Combined systolic and      



                                         diastolic congestive      



                                         heart failure,      



                                         unspecified congestive      



                                         heart failure chronicity,      



                                         Sinus tachycardia      

 

      



            hydroCHLOROthiazide     Take 1 tablet by mouth     90 tablet     0          11/14/20     03/15/20

                                         Discontin



              (HYDRODIURIL) 25 mg     daily as needed for Other       16           18           ued



                           tabletIndications:        (fluid retention).     



                                         Combined systolic and      



                                         diastolic congestive      



                                         heart failure,      



                                         unspecified congestive      



                                         heart failure chronicity      

 

      



              famotidine (PEPCID) 20 mg     Take 1 tablet by mouth 2     180 tablet     1            12/19/20

                           03/15/20                  Discontin



              tabletIndications: Right     times daily.       16           18           ued



                                         upper quadrant abdominal      



                                         pain      

 

      



            traMADol (ULTRAM) 50 mg     Take 1 tablet by mouth     30 tablet     0          05/23/20     03/15/20

                                         Discontin



              tabletIndications: RLQ     daily as needed for Pain.       17           18           ued



                                         abdominal pain      

 

      



                 gabapentin (NEURONTIN)     Take 300 mg by mouth 2        03/15/20        Discontin



                 300 mg capsule     times daily.        18              ued

 

      



            acetaminophen-codeine     TAKE ONE (1) TABLET(S) BY      0          03/12/20     03/15/20     

Discontin



              (TYLENOL #3) 300-30 mg     MOUTH EVERY SIX HOURS AS       18           18           ued



                           per tablet                NEEDED FOR DENTAL PAIN.     

 

      



            Levetiracetam (KEPPRA)     Take 1,000 mg by mouth 2      0          01/26/20     03/15/20     

Discontin



              1,000 mg tablet     times daily.       18           18           ued

 

      



            valproic acid 250 mg     Take 500 mg by mouth 2      0          01/26/20     03/15/20     Discontin





              capsule      times daily.       18           18           ued

 

      



            amoxicillin-clavulanate     Take 1 tablet by mouth 2     20 tablet     0          03/15/20    

 03/25/20                                



                 (AUGMENTIN) 875-125 mg     times daily for 10 days.       18              18 



                                         per tabletIndications:      



                                         Acute non-recurrent      



                                         maxillary sinusitis,      



                                         Cough      

 

      



            benzonatate (TESSALON     Take 2 capsules by mouth     20 capsule     0          03/15/20     

03/22/20                                 



                 PERLES) 100 mg     3 times daily as needed       18              18 



                           capsuleIndications: Acute     for up to 7 days for     



                           non-recurrent maxillary     Cough.     



                                         sinusitis, Cough      

 

      



              gabapentin (NEURONTIN)     Take 1 capsule by mouth 2     60 capsule     3            03/15/20 

                           08/11/20                  Discontin



              300 mg       times daily.       18           18           ued



                                         capsuleIndications:      



                                         Seizures      

 

      



           valproic acid 250 mg     NONFORM     120 capsule     3         03/15/20     03/21/20     Discontin





              capsuleIndications:     Take 2 capsules by mouth       18           18           ued



                           Seizures                  2 times daily.     

 

      



            traMADol (ULTRAM) 50 mg     Take 1 tablet by mouth     60 tablet     0          03/15/20     05/08/20

                                         Discontin



              tabletIndications: Pain     daily as needed for Pain.       18           18           ued



                                         in thoracic spine at      



                                         multiple sites      

 

      



              carvedilol (COREG) 6.25     Take 1 tablet by mouth 2     180 tablet     1            03/15/20 

                           07/02/20                  Discontin



              mg tabletIndications:     times daily (with meals).       18           18           ued



                                         Combined systolic and      



                                         diastolic congestive      



                                         heart failure,      



                                         unspecified congestive      



                                         heart failure chronicity,      



                                         Sinus tachycardia      

 

      



            lisinopril (PRINIVIL) 10     Take 1 tablet by mouth     90 tablet     1          03/15/20     

07/02/20                                 Discontin



              mg tabletIndications:     daily.       18           18           ued



                                         Combined systolic and      



                                         diastolic congestive      



                                         heart failure,      



                                         unspecified congestive      



                                         heart failure chronicity,      



                                         Sinus tachycardia      

 

      



              divalproex (DEPAKOTE) 250     Take 1 tablet by mouth 2     60 tablet     3            03/21/20

                           10/09/20                  Discontin



              mg delayed release     times daily.       18           18           ued



                                         tabletIndications:      



                                         Seizure      

 

      



            traMADol (ULTRAM) 50 mg     Take 1 tablet by mouth     60 tablet     0          20

                                         Discontin



              tabletIndications: Pain     daily as needed for Pain.       18           18           ued



                                         in thoracic spine at      



                                         multiple sites      

 

      



            lisinopril (PRINIVIL) 10     Take 1 tablet by mouth     90 tablet     1          07/02/20     

10/09/20                                 Discontin



              mg tabletIndications:     daily.       18           18           ued



                                         Sinus tachycardia      

 

      



              carvedilol (COREG) 25 mg     Take 1 tablet by mouth 2     180 tablet     1            07/02/20

                           10/09/20                  Discontin



              tabletIndications:     times daily (with meals).       18           18           ued



                                         Combined systolic and      



                                         diastolic congestive      



                                         heart failure,      



                                         unspecified HF chronicity      

 

      



            traMADol (ULTRAM) 50 mg     Take 1 tablet by mouth     60 tablet     1          20

                                         Discontin



              tabletIndications: Pain     every 12 hours.       18           18           ued



                                         in thoracic spine at      



                                         multiple sites      

 

      



            zolpidem (AMBIEN) 5 mg     Take 1 tablet by mouth     30 tablet     1          07/02/20     10/09/20

                                         Discontin



              TabIndications: Insomnia,     nightly at bedtime as       18           18           ued



                           unspecified type          needed for Insomnia.     

 

      



            traMADol (ULTRAM) 50 mg     Take 1 tablet by mouth     60 tablet     1          20

                                         Discontin



              tabletIndications: Pain     every 12 hours.       18           18           ued



                                         in thoracic spine at      



                                         multiple sites      

 

      



            traMADol (ULTRAM) 50 mg     Take 1 tablet by mouth     60 tablet     1          08/27/20     10/09/20

                                         Discontin



              tabletIndications: Pain     every 12 hours.       18           18           ued



                                         in thoracic spine at      



                                         multiple sites      

 

      



              carvedilol (COREG) 12.5     Take 2 tablets by mouth 2     180 tablet     1            10/09/20

                           10/09/20                  Discontin



              mg tabletIndications:     times daily (with meals).       18           18           ued



                                         Combined systolic and      



                                         diastolic congestive      



                                         heart failure,      



                                         unspecified HF chronicity      

 

      



            lisinopril (PRINIVIL,     Take 3 tablets by mouth     270 tablet     1          10/09/20     10/09/20

                                         Discontin



              ZESTRIL) 5 mg     daily.       18           18           ued



                                         tabletIndications:      



                                         Combined systolic and      



                                         diastolic congestive      



                                         heart failure,      



                                         unspecified HF chronicity      

 

      



            zolpidem (AMBIEN) 5 mg     Take 1 tablet by mouth     30 tablet     1          10/09/20     10/09/20

                                         Discontin



              TabIndications: Insomnia,     nightly at bedtime as       18           18           ued



                           unspecified type          needed for Insomnia.     

 

      



              divalproex (DEPAKOTE) 250     Take 1 tablet by mouth 2     60 tablet     3            10/09/20

                           10/09/20                  Discontin



              mg delayed release     times daily.       18           18           ued



                                         tabletIndications:      



                                         Seizure      

 

      



            traMADol (ULTRAM) 50 mg     Take 1 tablet by mouth     60 tablet     1          10/09/20     10/09/20

                                         Discontin



              tabletIndications: Pain     every 12 hours.       18           18           ued



                                         in thoracic spine at      



                                         multiple sites      







Active Problems







 



                           Problem                   Noted Date

 

 



                           Seizure                   2017

 

 



                           Convulsions               2017

 

 



                           Moderate episode of recurrent major depressive disorder     2016

 

 



                           Chest pain                2016

 

 



                           SOB (shortness of breath)     2016

 

 



                           Sinus tachycardia         2016

 

 



                           Dehydration               2016

 

 



                           RUQ abdominal pain        2016

 

 



                           Left wrist pain           2016

 

 



                           Back pain                 2016

 

 



                           Left hand pain            10/26/2015

 

 



                           History of substance abuse- MJ as teenager ; quit at age 18 yrs     08/10/2015

 

 



                           History of ADHD           08/10/2015

 

 



                           Testicular/scrotal pain     08/10/2015

 

 



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  



                                         Gradient  



                                         2.3  



                                         mmHg  



                                           



                                         AV Velocity Time  



                                         Integral 17.8  



                                         cm  



                                           



                                         LVOT Peak  



                                         Velocity  



                                         83  



                                         cm/s  



                                           



                                         LVOT Peak  



                                         Gradient  



                                         2.8  



                                         mmHg  



                                           



                                         LVOT Mean  



                                         Velocity  



                                         58.9  



                                         cm/s  



                                           



                                         LVOT Mean  



                                         Gradient  



                                         1.6  



                                         mmHg  



                                           



                                         LVOT Velocity Time  



                                         Integral 14.7  



                                         cm  



                                           



                                         LVOT Stroke  



                                         Volume  



                                         52.5  



                                         cm  



                                           



                                         AV Area Cont Eq  



                                         vti  



                                          3  



                                         cm  



                                           



                                         AV Area Cont Eq  



                                         pk  



                                         2.7  



                                         cm  



                                           



                                         Mitral E Point  



                                         Velocity  



                                         62  



                                         cm/s  



                                           



                                         Mitral A Point  



                                         Velocity  



                                         54  



                                         cm/s  



                                           



                                         Mitral E to A  



                                         Ratio  



                                           



                                         1.1  



                                           



                                         MV Deceleration  



                                         [place]  



                                         285  



                                         cm/s  



                                           



                                         MV Deceleration  



                                         Time  



                                         217  



                                         ms  



                                           



                                         PV Peak  



                                         Velocity  



                                         93.5  



                                         cm/s  



                                           



                                         PV Peak  



                                         Gradient  



                                         3.5  



                                         mmHg  



                                           



                                         RVOT Peak  



                                         Velocity  



                                         69.4  



                                         cm/s  



                                           



                                         RVOT Peak  



                                         Gradient  



                                         1.9  



                                         mmHg  



                                           



                                         LV E' Lateral  



                                         Velocity  



                                         9.2  



                                         cm/s  



                                           



                                         Mitral E to LV E' Lateral  



                                         Ratio  



                                         6.8  



                                           



                                         LV E' Septal  



                                         Velocity  



                                          7  



                                         cm/s  



                                           



                                         Mitral E to LV E' Septal  



                                         Ratio8.8  



                                           



                                         FINDINGS  



                                         Left Ventricle  



                                         The left ventricle is normal  



                                         in size. Upper-normal LV wall  



                                         thickness. LV  



                                         systolic function is low  



                                         normal. LVEF is 50-54%. There  



                                         are no regional wall  



                                         motion  



                                         abnormalities.There is  



                                         normal LV relaxation with  



                                         normal filling  



                                         pressures.  



                                         Right Ventricle  



                                         The right ventricle is normal  



                                         in size and systolic function.  



                                         TAPSE=1.8 cm.  



                                         Right Atrium  



                                         The right atrium is normal in  



                                         size.  



                                         Left Atrium  



                                         The left atrium is normal in  



                                         size.  



                                         IAS  



                                         Mitral Valve  



                                         Structurally normal mitral  



                                         valve without significant  



                                         stenosis or prolapse.  



                                         There is no mitral  



                                         regurgitation.  



                                         Aortic Valve  



                                         The aortic valve is  



                                         trileaflet and structurally  



                                         normal. There is no aortic  



                                         stenosis. There is no aortic  



                                         regurgitation.  



                                         Tricuspid Valve  



                                         Structurally normal tricuspid  



                                         valve without significant  



                                         stenosis. There is  



                                         trace tricuspid  



                                         regurgitation.Insufficient  



                                         TR jet to estimate  



                                         pulmonary  



                                         artery systolic pressure.  



                                         Pulmonic Valve  



                                         Structurally normal pulmonic  



                                         valve without significant  



                                         stenosis. There is trace  



                                         pulmonic regurgitation.  



                                         Pericardium  



                                         No pericardial effusion.  



                                         Aorta  



                                         Normal aortic root for body  



                                         surface area.  



                                         IVC  



                                         The inferior vena cava is  



                                         normal in size.  



                                         CONCLUSIONS  



                                         The left ventricle is normal  



                                         in size. Upper-normal LV wall  



                                         thickness.  



                                         LV systolic function is low  



                                         normal. LVEF is 50-54%.  



                                         There are no regional wall  



                                         motion abnormalities.  



                                         There is normal LV relaxation  



                                         with normal filling pressures.

  



                                         The right ventricle is  



                                         normal in size and systolic  



                                         function.  



                                         Atria are normal size.  



                                         No significant valvular  



                                         abnormalities.  



                                         Insufficient TR jet to  



                                         estimate pulmonary artery  



                                         systolic pressure.  



                                         No pericardial effusion.  



                                         When compared to prior study  



                                         from 2016, the LVEF is  



                                         improved, no regional  



                                         wall motion abnormalities are  



                                         identified  



                                         Tuan Farias MD

  



                                         (Electronically Signed)  



                                         Final Date:2018  



                                           



                                         17:11  



                                         2D ECHO  



                                         Body Surface  



                                         Area  



                                          2  



                                         m  



                                           



                                         LV Diastolic Diameter  



                                         PLAX4.7  



                                         cm  



                                         4.2 - 5.9 / 3.9 - 5.3 cm  



                                         LV Systolic Diameter  



                                         PLAX 3.2  



                                         cm  



                                         2.1 - 4.0 cm  



                                         LV Fractional Shortening  



                                         PLAX 32.1  



                                         %  



                                         25 - 46%  



                                         IVS Diastolic  



                                         Thickness  



                                         1.1  



                                         cm  



                                           



                                         IVS Systolic  



                                         Thickness  



                                         1.5  



                                         cm  



                                           



                                         LVPW Diastolic  



                                         Thickness1  



                                         .1  



                                         cm  



                                           



                                         LVPW Systolic  



                                         Thickness  



                                         1.4  



                                         cm  



                                           



                                         LV Relative Wall  



                                         Thickness0.46  



                                           



                                           



                                         LVOT  



                                         Diameter  



                                          2.1  



                                         cm  



                                           



                                         Aortic Root  



                                         Diameter  



                                         3.4  



                                         cm  



                                           



                                         LV Diastolic Volume MOD  



                                         BP93.7  



                                         cm  



                                         67 - 155 / 56 - 104 cm  



                                         LV Systolic Volume MOD  



                                         BP 43.7  



                                         cm  



                                         22 - 58 / 19 - 49 cm  



                                         LV Ejection Fraction MOD  



                                         BP 53.4  



                                         %  



                                         >=55%  



                                         LV Stroke Volume MOD  



                                         BP 50  



                                         cm  



                                           



                                         LV Cardiac Output MOD  



                                         [id number]  



                                         cm/min  



                                         LV Cardiac Index MOD  



                                         BP 2662  



                                         cm/minm  



                                         LA  



                                         Volume  



                                          44.7  



                                         cm  



                                         18 - 58 / 22 - 52 cm  



                                         LA Volume  



                                         Index  



                                          22.9  



                                         cm/m  



                                         16 - 28 cm/m  



                                         DOPPLER  



                                         AV Peak  



                                         Velocity  



                                         109  



                                         cm/s  



                                           



                                         AV Peak  



                                         Gradient  



                                         4.7  



                                         mmHg  



                                           



                                         AV Mean  



                                         Velocity  



                                         70  



                                         cm/s  



                                           



                                         AV Mean  



                                         Gradient  



                                         2.3  



                                         mmHg  



                                           



                                         AV Velocity Time  



                                         Integral 17.8  



                                         cm  



                                           



                                         LVOT Peak  



                                         Velocity  



                                         83  



                                         cm/s  



                                           



                                         LVOT Peak  



                                         Gradient  



                                         2.8  



                                         mmHg  



                                           



                                         LVOT Mean  



                                         Velocity  



                                         58.9  



                                         cm/s  



                                           



                                         LVOT Mean  



                                         Gradient  



                                         1.6  



                                         mmHg  



                                           



                                         LVOT Velocity Time  



                                         Integral 14.7  



                                         cm  



                                           



                                         LVOT Stroke  



                                         Volume  



                                         52.5  



                                         cm  



                                           



                                         AV Area Cont Eq  



                                         vti  



                                          3  



                                         cm  



                                           



                                         AV Area Cont Eq  



                                         pk  



                                         2.7  



                                         cm  



                                           



                                         Mitral E Point  



                                         Velocity  



                                         62  



                                         cm/s  



                                           



                                         Mitral A Point  



                                         Velocity  



                                         54  



                                         cm/s  



                                           



                                         Mitral E to A  



                                         Ratio  



                                           



                                         1.1  



                                           



                                         MV Deceleration  



                                         [place]  



                                         285  



                                         cm/s  



                                           



                                         MV Deceleration  



                                         Time  



                                         217  



                                         ms  



                                           



                                         PV Peak  



                                         Velocity  



                                         93.5  



                                         cm/s  



                                           



                                         PV Peak  



                                         Gradient  



                                         3.5  



                                         mmHg  



                                           



                                         RVOT Peak  



                                         Velocity  



                                         69.4  



                                         cm/s  



                                           



                                         RVOT Peak  



                                         Gradient  



                                         1.9  



                                         mmHg  



                                           



                                         LV E' Lateral  



                                         Velocity  



                                         9.2  



                                         cm/s  



                                           



                                         Mitral E to LV E' Lateral  



                                         Ratio  



                                         6.8  



                                           



                                         LV E' Septal  



                                         Velocity  



                                          7  



                                         cm/s  



                                           



                                         Mitral E to LV E' Septal  



                                         Ratio8.8  



                                           









   



                 Performing Organization     Address         City/[facility] [place]     Phone Number

 

   



                                         [facility]   





* HEMOGLOBIN A1C (2018 12:58 PM)





   



                 Component       Value           Ref Range       Performed At

 

   



                 Hemoglobin A1c     5.5             4.3 - 6.1 %     [facility]



                                         IMMUNOLOGY

 

   



                 Est Average Gluc     111.2           mg/dL           [facility]



                                         IMMUN[facility]













                                         Specimen

 





                                         Blood









   



                 Performing Organization     Address         City/State/[facility] [place]     Phone Number

 

   



                                         [facility]   

 

   



                                         [facility]   





* TSH (2018 12:58 PM)





   



                 Component       Value           Ref Range       Performed At

 

   



                 TSH             0.41 (L)        0.57 - 3.74 uIU/mL     [facility]













                                         Specimen

 





                                         Blood









   



                 Performing Organization     Address         City/State/[facility] [place]     Phone Number

 

   



                                         [facility]   

 

   



                                         [facility]   





* LIVER PROFILE (2018 12:58 PM)





   



                 Component       Value           Ref Range       Performed At

 

   



                 T Protein       7.2             6.0 - 8.3 g/dL     [facility]

 

   



                 Albumin         4.4             4.2 - 5.5 g/dL     [facility]

 

   



                 T Bilirubin     0.4             0.2 - 1.2 mg/dL     [facility]

 

   



                 Alk Phos        69              34 - 104 U/L     [facility]

 

   



                 AST             17              13 - 39 U/L     [facility]

 

   



                 ALT             22              7 - 52 U/L      [facility]

 

   



                 D Bilirubin     0.1             0.0 - 0.2 mg/dL     [facility]













                                         Specimen

 





                                         Blood









   



                 Performing Organization     Address         City/[facility] [place]     Phone Number

 

   



                                         [facility]   

 

   



                                          [facility]   





* LIPID PROFILE (2018 12:58 PM)





   



                 Component       Value           Ref Range       Performed At

 

   



                 Cholesterol     184             mg/dL           [facility]



                                         Comment:  



                                         REFERENCE RANGE:  



                                         Desirable: <200 mg/dL  



                                         Borderline: 200-240 mg/dL  



                                         High Risk: >240 mg/dL  

 

   



                 Triglyceride     70              <150 mg/dL      [facility]



                                         Comment:  



                                         REFERENCE RANGE:  



                                         Normal: <150 mg/dL  



                                         Borderline High: 150-199 mg/dL  



                                         High: 200-499 mg/dL  



                                         Very High: >af=133 mg/dL  

 

   



                 HDL             51              mg/dL           [facility]



                                         Comment:  



                                         Increased CHD risk: <40 mg/dL  



                                         Decreased CHD risk: >60 mg/dL  

 

   



                 LDL             119             mg/dL           [facility]



                                         Comment:  



                                         REFERENCE RANGE:  



                                         Optimal: <100 mg/dL  



                                         Near Optimal: 100-129 mg/dL  



                                         Borderline High: 130-159 mg/dL  



                                         High: 160-189 mg/dL  



                                         Very High: >ua=134 mg/dL  













                                         Specimen

 





                                         Blood









   



                 Performing Organization     Address         City/[facility] [place]     Phone Number

 

   



                                         [facility]   

 

   



                                         [facility]   





* CBC/DIFF (2018 12:58 PM)





   



                 Component       Value           Ref Range       Performed At

 

   



                 WBC             12.1 (H)        4.5 - 12.0 K/uL     [facility]

 

   



                 RBC             5.85            4.60 - 6.20 M/uL     [facility]

 

   



                 Hemoglobin      17.5            14.0 - 18.0 g/dL     [facility]

 

   



                 Hematocrit      52.5            40.0 - 54.0 %     [facility]

 

   



                 MCV             90              82 - 92 fL      [facility]

 

   



                 MCH             29.9            27.0 - 31.0 pg     [facility]

 

   



                 MCHC            33.3            32.0 - 36.0 g/dL     [facility]

 

   



                 RDW             44.4 (H)        35.1 - 43.9 fL     [facility]

 

   



                 Platelet        386             150 - 400 K/uL     [facility]

 

   



                 Mean Platelet Volume     10.0            9.4 - 12.4 fL     [facility]

 

   



                     Percent NRBC        0.0                 [facility]

 

   



                     Absolute NRBC       0.00                [facility]

 

   



                 Neutrophil      75.5 (H)        34.0 - 67.9 %     [facility]

 

   



                 Lymphocyte      16.1 (L)        21.8 - 50.0 %     [facility]

 

   



                 Monocyte        6.3             5.3 - 12.0 %     [facility]

 

   



                 Eosinophil      1.2             0.8 - 5.0 %     [facility]

 

   



                 Basophil        0.5             0.2 - 1.2 %     [facility]

 

   



                 Pct Immat Gran     0.4             0.0 - 0.5       [facility]

 

   



                 Neutrophil, Abs     9.14 (H)        1.78 - 5.36 K/uL     [facility]

 

   



                 Lymphocyte, Abs     1.95            1.32 - 3.57 K/uL     [facility]

 

   



                 Monocyte, Abs     0.76            0.30 - 0.82 K/uL     [facility]

 

   



                 Eosinophil, Abs     0.15            0.04 - 0.54 K/uL     [facility]

 

   



                 Basophil, Abs     0.06            0.01 - 0.08 K/uL     [facility]

 

   



                 Absol Immat Gran     0.05 (H)        0.00 - 0.03 K/uL     [facility]













                                         Specimen

 





                                         Blood









   



                 Performing Organization     Address         City/[facility] [place]     Phone Number

 

   



                                         [facility]   

 

   



                                         [facility]   





* BASIC METABOLIC PANEL (2018 12:58 PM)





   



                 Component       Value           Ref Range       Performed At

 

   



                 CO2             26              21 - 31 mmol/L     [facility]

 

   



                 Chloride        103             98 - 107 mmol/L     [facility]

 

   



                 Potassium       4.8             3.5 - 5.1 mmol/L     [facility]

 

   



                 Sodium          137             136 - 145 mmol/L     [facility]

 

   



                 Glucose         110             70 - 110 mg/dL     [facility]

 

   



                 Urea Nitrogen     11              7 - 25 mg/dL     [facility]

 

   



                 Creatinine      0.90            0.7 - 1.3 mg/dL     [facility]

 

   



                     Anion Gap           8                   [facility]

 

   



                 Calcium         10.3            8.6 - 10.3 mg/dL     [facility]

 

   



                 GFR, Estimated     >60             mL/min/1.73 m2     [facility]

 

   



                 GFR, Estim, Afr-Am     >60             mL/min/1.73 m2     [facility]













                                         Specimen

 





                                         Blood









   



                 Performing Organization     Address         City/[facility] [place]     Phone Number

 

   



                                         [facility]   

 

   



                                         [facility]   





* 12 LEAD EKG (2018 10:56 AM)





   



                 Component       Value           Ref Range       Performed At

 

   



                     12 LEAD EKG FOR [facility]  



                                           



                                           



                                           



                                           



                                           



                                          Test  



                                         Date:2018  



                                         Pat Name: ODILON ARIAS  



                                         Department:  



                                         Patient ID:  



                                         [id number]  



                                         Room:  



                                           



                                         Gender:  



                                         M  



                                         Technician:  



                                          [id number]  



                                         [id number]  



                                          Requested By:  



                                         Order  



                                         Number:  



                                         NIGEL munoz MD: Lucas Santiago M.D.  



                                           



                                           



                                          Measurements  



                                         Intervals  



                                           



                                         Axis  



                                           



                                         Rate:  



                                         92  



                                           



                                         P:65  



                                         HI:  



                                         128  



                                         QRS:  



                                         78  



                                         QRSD:  



                                         74  



                                           



                                         T:49  



                                         QT:  



                                         334  



                                           



                                           



                                         QTc:413  



                                           



                                           



                                           



                                           



                                           



                                         Interpretive Statements  



                                         Normal sinus rhythm  



                                         Normal ECG  



                                         Electronically Signed On  



                                         18 17:05:02 CDT by  



                                         Lucas Santiago M.D.  









   



                 Performing Organization     Address         City/State/Zipcode     Phone Number

 

   



                                         [facility]   





after 2017

## 2019-07-06 NOTE — DIAGNOSTIC IMAGING REPORT
CT Abdomen And Pelvis with Intravenous Contrast



INDICATION: Abdominal pain  

^[id number]

^1823



TECHNIQUE: Thin collimation axial images obtained from the diaphragm to the

level of the pubic symphysis following the uneventful administration of  100 cc

of low osmolar, nonionic intravenous contrast.



Dose reduction techniques used: Automated exposure control, adjustment of the

mAs and/or kVp according to patient size, standardized low-dose protocol,

and/or iterative reconstruction technique.





RADIATION DOSE:

     Total DLP: 688.42 mGy*cm    

     Estimated effective dose: (DLP x 0.015 x size factor) mSv

     CTDIvol has been reviewed. It is below the limits set by the Radiation

Protocol Committee (RPC).



COMPARISON: Report of CT of the abdomen/pelvis performed 1/2/2016.

 

ABDOMEN FINDINGS:



Lung Bases: Bibasilar microatelectasis.



Liver: Normal attenuation.  No evidence for mass.



Gallbladder: Present and appears normal. No biliary ductal dilatation.



Pancreas: Normal attenuation without mass or ductal dilatation.



Spleen: Normal in size.  No evidence of mass.



Adrenal Glands: No evidence for mass.



Kidneys: 



Right: Normal enhancement.  No soft tissue mass. No calculus. No

hydronephrosis.



Left:  Normal enhancement.  No soft tissue mass. No calculus. No

hydronephrosis.



Lymph Nodes: No enlarged abdominal or retroperitoneal lymph nodes.



Aorta:   Normal in diameter



PELVIS FINDINGS: 



Bowel: 

Stomach:  Normal.

Small Bowel: Normal in caliber with normal wall thickness.  

Large Bowel: Normal in caliber with normal wall thickness.  There are chain

sutures at the base of the cecum

Appendix: Not visualized and may be absent.



Bladder: Normal.



Peritoneum/retroperitoneum: No free fluid or fluid collection.



Bones: Unremarkable for age.



IMPRESSION:



1.   No evidence for bowel obstruction or inflammation.



2.   No CT abnormalities to explain pain.



Signed by: Dr. Pema Richardson MD on 7/6/2019 8:03 PM

## 2019-07-06 NOTE — DIAGNOSTIC IMAGING REPORT
EXAMINATION:  CXR 2 VIEW - [facility]    



INDICATION: Mid abdominal pain, nausea 

^[id number]

^1851



COMPARISON:  No relevant priors are available for comparison.

     

FINDINGS:  PA and lateral views



TUBES and LINES:  None.



LUNGS:  Lungs are well inflated.  There is no evidence of pneumonia or

pulmonary edema.



PLEURA:  No pleural effusion or pneumothorax.



HEART AND MEDIASTINUM:  The cardiomediastinal silhouette is unremarkable..  



BONES AND SOFT TISSUES:  No focal osseous lesions.   Soft tissues are

unremarkable.



UPPER ABDOMEN: No free air under the diaphragm. 



IMPRESSION: 

No acute thoracic abnormality.





Signed by: Dr. Pema Richardson MD on 7/6/2019 8:08 PM

## 2019-07-06 NOTE — XMS REPORT
Patient Summary Document

                             Created on: 2019



CARLITO ARIAS

External Reference #: [id number]

: 1985

Sex: Male



Demographics







                          Address                   [address] [place]  [address]

 

                          Home Phone                [phone]

 

                          Preferred Language        Unknown

 

                          Marital Status            Unknown

 

                          Worship Affiliation     Unknown

 

                          Race                      Unknown

 

                                        Additional Race(s)  

 

                          Ethnic Group              Unknown





Author







                          Author                    [facility]

 

                          Address                   Unknown

 

                          Phone                     Unavailable







Support







                Name            Relationship    Address         Phone

 

                    PATRICK KEE     PRS                 [address] [place]  [address]                    [phone]

 

                    MERCEDPAO YAP     PRS                 [address]

[id number]

[place]  [address]                    [phone]







Care Team Providers







                    Care Team Member Name    Role                Phone

 

                    FRANCINE MOCK       Unavailable         Unavailable







Payers







             Payer Name    Policy Type    Policy Number    Effective Date    Expiration Date







Problems

This patient has no known problems.



Allergies, Adverse Reactions, Alerts







          Allergy Name    Allergy Type    Status    Severity    Reaction(s)    Onset Date    Inactive 

Date                      Treating Clinician        Comments

 

        Penicillins    DA      Active    U               2019 00:00:00                     

 

        caffeine    DA      Active    MI              2019 00:00:00                     

 

        dicyclomine    DA      Active    MO              2019 00:00:00                     

 

        ketorolac    DA      Active    MO              2019 00:00:00                     

 

        perflutren    DA      Active    MO              2019 00:00:00                     

 

        Penicillins    DA      Active    U               2018 00:00:00                     

 

        caffeine    DA      Active    MI              2018 00:00:00                     

 

        dicyclomine    DA      Active    MO              2018 00:00:00                     

 

        ketorolac    DA      Active    MO              2018 00:00:00                     

 

        perflutren    DA      Active    MO              2018 00:00:00                     

 

        Penicillins    DA      Active    U               2018 00:00:00                     

 

        caffeine    DA      Active    MI              2018 00:00:00                     

 

        dicyclomine    DA      Active    MO              2018 00:00:00                     

 

        ketorolac    DA      Active    MO              2018 00:00:00                     

 

        perflutren    DA      Active    MO              2018 00:00:00                     

 

        Penicillins    DA      Active    U               2018 00:00:00                     

 

        caffeine    DA      Active    MI              2018 00:00:00                     

 

        dicyclomine    DA      Active    MO              2018 00:00:00                     

 

        ketorolac    DA      Active    MO              2018 00:00:00                     

 

        perflutren    DA      Active    MO              2018 00:00:00                     







Medications

This patient has no known medications.



Encounters







             Start Date/Time    End Date/Time    Encounter Type    Admission Type    Attending [facility]       Care Department     Encounter ID

 

        2019 00:00:00    2019 00:00:00    Outpatient                    [facility]     [id number]

 

        2019 16:38:00    2019 16:38:00    Emergency    E               [facility]    [id number]

 

        2019 00:00:00    2019 00:00:00    Outpatient                    [facility]     [id number]

 

        2019-05-10 00:00:00    2019-05-10 00:00:00    Outpatient                    [facility]     [id number]

 

        2019 00:00:00    2019 00:00:00    Outpatient                    [facility]     [id number]

 

        2019 14:39:21    2019 14:39:21    Outpatient                    [facility]     [id number]

 

        2019 00:00:00    2019 00:00:00    Outpatient                    [facility]     [id number]

 

        2019 00:00:00    2019 00:00:00    Outpatient                    [facility]     [id number]

 

        2019 00:00:00    2019 00:00:00    Outpatient                    [facility]     [id number]

 

        2019 00:00:00    2019 00:00:00    Outpatient                    [facility]     [id number]

 

        2019 00:00:00    2019 00:00:00    Outpatient                    [facility]     [id number]

 

        2019 00:00:00    2019 00:00:00    Outpatient                    [facility]     [id number]

 

        2019 00:00:00    2019 00:00:00    Outpatient                    [facility]     [id number]

 

        2019 09:58:34    2019 09:58:34    Outpatient                    [facility]     [id number]

 

        2019 09:48:29    2019 09:48:29    Outpatient                    [facility]     [id number]

 

        2019 15:51:56    2019 15:51:56    Outpatient                    [facility]     [id number]

 

        2019 12:13:05    2019 12:13:05    Outpatient                    [facility]     [id number]

 

        2019 00:00:00    2019 00:00:00    Outpatient                    [facility]     [id number]

 

        2019-01-10 00:00:00    2019-01-10 00:00:00    Outpatient                    [facility]     [id number]

 

        2018 00:00:00    2018 00:00:00    Outpatient                    [facility]     [id number]

 

        2018-10-09 15:21:32    2018-10-09 15:21:32    Outpatient                    [facility]     [id number]

 

        2018-10-09 00:00:00    2018-10-09 00:00:00    Outpatient                    [facility]     [id number]

 

        2018 00:00:00    2018 00:00:00    Outpatient                    [facility]     [id number]

 

        2018 00:00:00    2018 00:00:00    Outpatient                    [facility]     [id number]

 

        2018 14:07:27    2018 14:07:27    Outpatient                    [facility]     [id number]

 

        2018 00:00:00    2018 00:00:00    Outpatient                    [facility]     [id number]

 

        2018 13:02:29    2018 13:02:29    Outpatient                    [facility]     [id number]

 

        2018 09:19:21    2018 09:19:21    Outpatient                    [facility]     [id number]







Results







           Test Description    Test Time    Test Comments    Text Results    Atomic Results    Result

 Comments

 

                - XR HAND 3 + V LT    2019 17:40:00                      Name: CARLITO ARIAS              

       [facility]    : 1985 Age/S:33  /M           
[address] [facility]           Unit#:[id number]     Loc: [place] [address]               Phys: Irene Nick  NP                       
                         Acct#: [id number] Dis Date:                   PHONE 
#: [phone]      Status: DEP ER                                   FAX #: 
[phone]      Exam Date: 2019           Reason: s/p staple removal    
                             EXAMS:                                             
 CPT CODE:      [id number] XR HAND 3 + V LT                           [address]2766      
             EXAM: Right hand, 3 views;               INFORMATION: Status post 
trauma; status post removal of a metallic       staple;                 
IMPRESSION:         1. No evidence of residual foreign body after removal of a 
stable.         2. The oblique view it now demonstrates a 7 x 1 mm cortical bone
        fragment along the radial aspect of the proximal portion of the third   
     metacarpal bone. This is consistent with localized osseous trauma         
secondary to the staple injury.                   ** Electronically Signed by 
KIMI Garcias **           **             on 2019 at 1740          
  **                      Reported and signed by: Chinedu Garcias M.D.         
                  CC: Geovani Costa MD                                     
                                                                             
Technologist: Melissa Perea                                            Trnscrpt 
Data: 2019 (737) Danish                          Orig Print D/T: S: 
2019 (4468)                         PAGE  1                       Signed 
Report                                    

 

                - XR HAND 3 + V LT    2019 17:05:00                      Name: CARLITO ARIAS              

       [facility]    : 1985 Age/S:33  /M           
[address] [facility]           Unit#:[id number]     Loc: [place] [address]               Phys: Irene Nick NP                       
                         Acct#: [id number] Dis Date:                   PHONE 
#: [phone]      Status: REG                                    FAX #: 
[phone]      Exam Date: 2019           Reason: staple in palm of left
hand                         EXAMS:                                             
 CPT CODE:      [id number] XR HAND 3 + V LT                           46071      
             EXAM: Left hand, 3 views               INFORMATION: Trauma; stable 
in palm of left hand;                 IMPRESSION:         A new shape metallic 
foreign body is seen within soft tissues of the         left hand. It measures 
about 2 cm in length.         Osseous structures are intact; no evidence of 
fracture or dislocation.                   ** Electronically Signed by KIMI Garcias **           **             on 2019 at 8352             **
                     Reported and signed by: Chinedu Garcias M.D.              
                CC:                                                             
                                                                           
Technologist: Melissa Perea                                            Trnscrpt 
Data: 2019 (7653) Danish                          Orig Print D/T: S: 
2019 (0778)                         PAGE  1                       Signed 
Report                                    

 

                - XR HAND 3 + V RT    2019-06-10 13:05:00                      Name: CARLITO ARIAS              

       [facility]    : 1985 Age/S:33  /M           
[address] [facility]           Unit#:[id number]     Loc: [place] [address]               Phys: Irene Nick NP                       
                         Acct#: [id number] Dis Date:                   PHONE 
#: [phone]      Status: REG ER                                   FAX #: 
[phone]      Exam Date: 06/10/2019           Reason: fell from standing 
yest R index finger pain         EXAMS:                                         
     CPT CODE:      [id number] XR HAND 3 + V RT                           10803  
                 HISTORY fall and index finger pain.               COMPARISON: 
X-ray from 2018.               3 views of the right hand:           
   No acute fracture or dislocation. Joint spaces are preserved. No       
erosive or destructive changes are noted.                 IMPRESSION:           
       No acute fracture or dislocation. Joint spaces are preserved.          **
Electronically Signed by KIMI Fuentes on 06/10/2019 at 1300 **             
        Reported and signed by: Moses Fuentes M.D.                            
CC:                                                                             
                                                           Technologist: Veronica Aponte RT(R)(CT)                                 Trnscrpt Data: 06/10/2019 (9745)
t.SDR.TH4                          Orig Print D/T: S: 06/10/2019 (1723)         
               PAGE  1                       Signed Report                      
                                         

 

                - CT ABD PELVIS W/CONT    2019 13:03:00                      Name: CARLITO ARIAS          

          [facility]                     : 1985 Age/S: 33  / M     
   [address] [facility]                Unit #: [id number]     Loc:               
[place]  [address]              Phys: Curtis Whitaker MD                        
                           Acct: [id number]  Dis Date:               Status: 
REG ER                                  PHONE #: [phone]     Exam Date: 
2019  1245                     FAX #: [phone]      Reason: LUQ pain 
post fall                                  EXAMS:                               
               CPT CODE:      [id number] CT ABD PELVIS W/CONT                    
  [address]                    HISTORY:   LUQ pain post fall               
TECHNIQUE:  Immediate and delayed 5 mm axial CT images were obtained       
through the abdomen and pelvis after IV administration of 100 mL of       
Isovue-370 contrast. Sagittal and coronal reformatted images were       
generated. Automated exposure control for dose reduction.               
COMPARISON: 9/19/15               FINDINGS:                Mild dependent 
subsegmental atelectasis. Normal heart size.               Liver, gallbladder, 
pancreas, spleen, adrenal glands, and kidneys are       unremarkable.           
   Limited evaluation of the GI tract without oral contrast. Stomach,       
small bowel, and colon are unremarkable. Appendectomy.               No free air
or free fluid. No lymphadenopathy. Abdominal aorta is       normal in caliber.  
            Urinary bladder is unremarkable. Hysterectomy. No pelvic free fluid.
              Regional osseous structures are unremarkable.                     
   IMPRESSION:                    No acute intra-abdominal process.             
                ** Electronically Signed by Judy Lee D.O. on 2019 at 1303
**                      Reported and signed by: Judy Lee D.O.       CC: 
Curtis Whitaker MD                                                                
                                                       
Technologist:Campbell Flowers RT(R),(MR),(CT);  CTDI:        DLP:        Trnscb 
Date/Time: 2019 (0708) t.[email or web address].LDP1                       Orig Print D/T: S:
2019 (8057)      PAGE  1                       Signed Report              
                                         

 

                - CT HEAD/BRAIN W/O CONT    2019 12:56:00                      Name: CARLITO ARIAS        

            [facility]                     : 1985 Age/S: 33  / M   
     [address] [facility]                Unit #: [id number]     Loc:               
[place]  [address]              Phys: Curtis Whitaker MD                        
                           Acct: [id number]  Dis Date:               Status: 
REG ER                                  PHONE #: [phone]     Exam Date: 
2019  1245                     FAX #: [phone]      Reason: Seizure   
                                         EXAMS:                                 
             CPT CODE:      [id number] CT HEAD/BRAIN W/O CONT                    
[address]                    HISTORY:  Seizure               TECHNIQUE: Noncontrast 
2.5 mm axial CT of the head. Examination       acquired within 24 hours of 
arrival. Automated exposure control for       dose reduction; DLP:  708 mGy-cm. 
             COMPARISON: 18               FINDINGS:               No acute 
hemorrhage. No CT evidence of acute infarct. No intracranial       mass or mass 
effect. No hydrocephalus. No extra-axial fluid       collection.                
Mild bilateral frontal sinus mucosal thickening. Mastoid air cells and       
middle ear cavities are clear. Orbital contents are unremarkable.       
Calvarium and skull base are intact.                         IMPRESSION:        
          No acute intracranial process. No mass or mass effect.                
   ** Electronically Signed by Judy Lee D.O. on 2019 at 1256 **          
           Reported and signed by: Judy Lee D.O.                CC: Primitivo Whitaker MD                                                                         
                                              Technologist:Campbell Flowers 
RT(R),(MR),(CT);  CTDI:        DLP:        Trnscb Date/Time: 2019 (1256) 
[email or web address]                       Orig Print D/T: S: 2019 (1300)      PAGE 
1                       Signed Report                                    

 

                BASIC METABOLIC PANEL    2019 12:29:00                      

 

   

 

                SODIUM (test code=NA)    139 mmol/L      136-145          

 

                POTASSIUM (test code=K)    4.3 mmol/L      3.5-5.1          

 

                CHLORIDE (test code=CL)    109.0 mmol/L               

 

                CARBON DIOXIDE (test code=CO2)    25.0 mmol/L     21-32            

 

                ANION GAP (test code=GAP)    9.3             10-20            

 

                GLUCOSE (test code=GLU)    101 mg/dL                  

 

                BLOOD UREA NITROGEN (test code=BUN)    14 mg/dL        7-18             

 

                GLOMERULAR FILTRATION RATE (test code=GFR)    > 60 mL/min     >=60            Estimated GFR by 

using Modified MDRD formula.Chronic kidney disease is defined as either kidney 
damageor GFR <60 mL/min/1.73 m2 for >3 months.

 

                CREATININE (test code=CREAT)    0.80 mg/dL      0.7-1.3          

 

                BUN/CREATININE RATIO (test code=BUN/CREA)    17.5            10-20            

 

                CALCIUM (test code=CA)    9.2 mg/dL       8.5-10.1         





VALPROIC ACID (DEPAKENE)2019 12:29:00* 





                Test Item       Value           Reference Range    Comments

 

                VALPROIC ACID (DEPAKENE) (test code=VALP)    19.0 mcg/mL     50.0-100.0       





BASIC METABOLIC [id number] 12:03:00* 





                Test Item       Value           Reference Range    Comments

 

                SODIUM (test code=NA)    139 mmol/L      136-145          

 

                POTASSIUM (test code=K)    4.3 mmol/L      3.5-5.1          

 

                CHLORIDE (test code=CL)    109.0 mmol/L               

 

                CARBON DIOXIDE (test code=CO2)     mmol/L         21-32            

 

                ANION GAP (test code=GAP)                    10-20            

 

                GLUCOSE (test code=GLU)     mg/dL                     

 

                BLOOD UREA NITROGEN (test code=BUN)     mg/dL          7-18             

 

                GLOMERULAR FILTRATION RATE (test code=GFR)     mL/min         >=60             

 

                CREATININE (test code=CREAT)     mg/dL          0.7-1.3          

 

                BUN/CREATININE RATIO (test code=BUN/CREA)                    10-20            

 

                CALCIUM (test code=CA)     mg/dL          8.5-10.1         





VALPROIC ACID (DEPAKENE)2019 12:03:00* 





                Test Item       Value           Reference Range    Comments

 

                VALPROIC ACID (DEPAKENE) (test code=VALP)     mcg/mL         50.0-100.0       





CBC W/O [id number] 11:46:00* 





                Test Item       Value           Reference Range    Comments

 

                WHITE BLOOD CELL (test code=WBC)    9.6 K/mm3       4.5-12.5         

 

                RED BLOOD CELL (test code=RBC)    5.49 mill/mm3    4.0-5.8          

 

                HEMOGLOBIN (test code=HGB)    16.0 gram/dL    13.0-17.5        

 

                HEMATOCRIT (test code=HCT)    48.6 %          42.0-52.0        

 

                MEAN CELL VOLUME (test code=MCV)    88.5 fL         80-98            

 

                MEAN CELL HGB (test code=MCH)    29.1 picogram    27.0-33.0        

 

                MEAN CELL HGB CONCETRATION (test code=MCHC)    32.9 gram/dL    33.0-36.0        

 

                RED CELL DISTRIBUTION WIDTH (test code=RDW)    13.7 %          11.6-16.2        

 

                PLATELET COUNT (test code=PLT)    266 K/mm3       150-450          

 

                MEAN PLATELET VOLUME (test code=MPV)    9.2 fL          6.7-11.0         





- XR CHEST 1 -69-91 11:37:00 FAX:         Curtis Whitaker MD     [phone]
   [place]: B   St: PRE----------
---------------------------------------------------------------------  Name:   CARLITO JAIMES                   [facility]                     : [birth year]  Age/S: 33/M           [address]                Unit #: [id number]    
 Loc: [place]  [address]              Phys: Curtis Whitaker MD     
                                              Acct: [id number] Dis Date:      
        Status: PRE ER                                 PHONE #: [phone]    
Exam Date:     2019     1118                   FAX #: [phone]     
Reason: Seizure                                            EXAMS:               
                               CPT CODE:      [id number] XR CHEST 1 V            
                  [address]                    HISTORY: Seizure               SHARDA
HNIQUE: AP chest x-ray               COMPARISON: 19               FINDINGS: 
             No airspace consolidation or pleural effusion. Normal heart size.  
    Mediastinal silhouette is unremarkable. Visualized osseous structures       
are grossly intact.                 IMPRESSION:                   No radiograp
hic evidence of acute cardiopulmonary process.          ** Electronically Signed
by Judy Lee D.O. on 2019 at 1137 **                      Reported and si
gned by: Judy Lee D.O.                         CC: Curtis Whitaker MD            
                                                                                
                          Technologist: Faustina Dixon RT(R)                    
              Trnscrd Date/Time/By: 2019 (8413) : By: [email or web address]          
Orig Print D/T: S: 2019 (7317)                         PAGE  1            
          Signed Report                               URINALYSIS COMPLETE
2019 17:27:00* 





                Test Item       Value           Reference Range    Comments

 

                UA COLOR (test code=COLU)    YELLOW          YELLOW           

 

                UA APPEARANCE (test code=APPU)    CLEAR           CLEAR            

 

                UA GLUCOSE DIPSTICK (test code=DGLUU)    norm mg/dL      NEGATIVE         

 

                UA BILIRUBIN DIPSTICK (test code=BILU)    NEGATIVE mg/dL    NEGATIVE         

 

                UA KETONE DIPSTICK (test code=KETU)    5 (Trace) mg/dL    NEGATIVE         

 

                UA SPECIFIC GRAVITY (test code=SGU)    1.020           1.001-1.035      

 

                UA BLOOD DIPSTICK (test code=YARELI)    neg Pop/uL      NEGATIVE         

 

                UA PH DIPSTICK (test code=DUNCAN)    6.0             5.0-8.0          

 

                UA PROTEIN DIPSTICK (test code=PROU)    15 (TRACE) mg/dL    Neg-15           

 

                UA UROBILINIOGEN DIPSTICK (test code=URO)    1 mg/dL         0.0-0.2          

 

                UA NITRITE DIPSTICK (test code=YO)    NEGATIVE        NEGATIVE         

 

                    UA LEUKOCYTE ESTERASE DIPSTICK (test code=LEUU)    25 Joana/uL (Trace) uL    NEGATIVE 

                                         

 

                UA WBC (test code=WBCU)    0-5 per HPF     0-5              

 

                UA RBC (test code=RBCU)    NONE SEEN per HPF    0-5              

 

                UA EPITHELIAL CELLS (test code=EPIU)    Rare (0-1/hpf) per HPF    Few              

 

                UA BACTERIA (test code=BACU)    FEW per HPF     NONE             

 

                UA MUCUS (test code=MUCU)    FEW per LPF     NONE-FEW         





Urine Source? Clean CatchDRUGS OF ABUSE SCREEN [id number] 17:27:00* 





                Test Item       Value           Reference Range    Comments

 

                URN COCAINE (test code=COCAURN)    NEGATIVE        NEGATIVE         

 

                URN CANNABINOIDS (test code=CANNABURN)    NEGATIVE        NEGATIVE         

 

                URN AMPHETAMINE (test code=AMPHETURN)    NEGATIVE        NEGATIVE         

 

                URN BARBITURATE (test code=BARBITURN)    NEGATIVE        NEGATIVE         

 

                URN BENZODIAZEPINE (test code=BENZOURN)    NEGATIVE        NEGATIVE         

 

                URN OPIATES (test code=OPIATURN)    POSITIVE        NEGATIVE         

 

                URN PHENCYCLIDINE (PCP) (test code=PHENCURN)    NEGATIVE        NEGATIVE         





Urine Source? Clean CatchURINALYSIS [id number] 17:24:00* 





                Test Item       Value           Reference Range    Comments

 

                UA COLOR (test code=COLU)    YELLOW          YELLOW           

 

                UA APPEARANCE (test code=APPU)    CLEAR           CLEAR            

 

                UA GLUCOSE DIPSTICK (test code=DGLUU)    norm mg/dL      NEGATIVE         

 

                UA BILIRUBIN DIPSTICK (test code=BILU)    NEGATIVE mg/dL    NEGATIVE         

 

                UA KETONE DIPSTICK (test code=KETU)    5 (Trace) mg/dL    NEGATIVE         

 

                UA SPECIFIC GRAVITY (test code=SGU)    1.020           1.001-1.035      

 

                UA BLOOD DIPSTICK (test code=YARELI)    neg Pop/uL      NEGATIVE         

 

                UA PH DIPSTICK (test code=DUNCAN)    6.0             5.0-8.0          

 

                UA PROTEIN DIPSTICK (test code=PROU)    15 (TRACE) mg/dL    Neg-15           

 

                UA UROBILINIOGEN DIPSTICK (test code=URO)    1 mg/dL         0.0-0.2          

 

                UA NITRITE DIPSTICK (test code=YO)    NEGATIVE        NEGATIVE         

 

                    UA LEUKOCYTE ESTERASE DIPSTICK (test code=LEUU)    25 Joana/uL (Trace) uL    NEGATIVE 

                                         

 

                UA WBC (test code=WBCU)     per HPF        0-5              

 

                UA RBC (test code=RBCU)     per HPF        0-5              

 

                UA EPITHELIAL CELLS (test code=EPIU)     per HPF        Few              

 

                UA BACTERIA (test code=BACU)     per HPF        NONE             





Urine Source? Clean CatchDRUGS OF ABUSE SCREEN [id number] 17:24:00* 





                Test Item       Value           Reference Range    Comments

 

                URN COCAINE (test code=COCAURN)                    NEGATIVE         

 

                URN CANNABINOIDS (test code=CANNABURN)                    NEGATIVE         

 

                URN AMPHETAMINE (test code=AMPHETURN)                    NEGATIVE         

 

                URN BARBITURATE (test code=BARBITURN)                    NEGATIVE         

 

                URN BENZODIAZEPINE (test code=BENZOURN)                    NEGATIVE         

 

                URN OPIATES (test code=OPIATURN)                    NEGATIVE         

 

                URN PHENCYCLIDINE (PCP) (test code=PHENCURN)                    NEGATIVE         





Urine Source? Clean CatchBASIC METABOLIC [id number] 17:15:00* 





                Test Item       Value           Reference Range    Comments

 

                SODIUM (test code=NA)    138 mmol/L      135-148          

 

                POTASSIUM (test code=K)    3.7 mmol/L      3.5-5.1          

 

                CHLORIDE (test code=CL)    103 mmol/L      101-109          

 

                CARBON DIOXIDE (test code=CO2)    24.7 mmol/L     21-32            

 

                ANION GAP (test code=GAP)    14 mmol/L       10-20            

 

                GLUCOSE (test code=GLU)    89 mg/dL                   

 

                BLOOD UREA NITROGEN (test code=BUN)    12 mg/dL        3-21             

 

                GLOMERULAR FILTRATION RATE (test code=GFR)    > 60 mL/min     >=60            Estimated GFR by 

using Modified MDRD formula.Chronic kidney disease is defined as either kidney 
damageor GFR <60 mL/min/1.73 m2 for >3 months.

 

                CREATININE (test code=CREAT)    0.96 mg/dL      0.55-1.3         

 

                BUN/CREATININE RATIO (test code=BUN/CREA)    12.5            10-20            

 

                CALCIUM (test code=CA)    9.5 mg/dL       8.4-10.2         





HEPATIC FUNCTION [id number] 17:15:00* 





                Test Item       Value           Reference Range    Comments

 

                TOTAL PROTEIN (test code=PROT)    7.6 g/dL        6.5-8.4          

 

                ALBUMIN (test code=ALB)    4.1 g/dL        3.4-4.8          

 

                GLOBULIN (test code=GLOB)    3.5 G/DL        1-10             

 

                ALBUMIN/GLOBULIN RATIO (test code=A/G)    1.2 RATIO       0.75-1.50        

 

                BILIRUBIN TOTAL (test code=BILT)    0.30 mg/dL      0.0-1.0          

 

                BILIRUBIN DIRECT (test code=BILD)    0.10 mg/dL      0.0-0.30         

 

                SGOT/AST (test code=AST)    17 U/L          6-32             

 

                SGPT/ALT (test code=ALT)    37 U/L          12-78           Note: Change in REFERENCE RANGE due to 

new reagent method.

 

                ALKALINE PHOSPHATASE TOTAL (test code=ALKP)    74 U/L                     





[id number] 17:15:00* 





                Test Item       Value           Reference Range    Comments

 

                LIPASE (test code=LIP)    127 U/L         128-270          





[id number] 17:15:00* 





                Test Item       Value           Reference Range    Comments

 

                MAGNESIUM (test code=MAG)    2.2 mg/dL       1.6-2.3          





CPK-MB [id number] 17:15:00* 





                Test Item       Value           Reference Range    Comments

 

                CREATINE KINASE (CK) (test code=CK)    96 U/L                     

 

                CKMB (test code=CKMBT)    0.5 ng/mL       0.0-5.0          

 

                RELATIVE % INDEX (test code=REL%)    0.5 %                            





[id number] 17:15:00* 





                Test Item       Value           Reference Range    Comments

 

                TROPONIN-I (test code=TROPI)    <0.015 ng/mL    0.00-0.056       





- XR CHEST 1 -28-26 17:11:00  Name: CARLITO ARIAS                     
[facility]    : 1985 Age/S:33  /M            [address] 
[facility]           Unit#:[id number]     Loc: [place] 
[place] [address]               Phys: Ame Lamb MD                             
               Acct#: [id number] Dis Date:                   PHONE #: 
[phone]      Status: REG                                    FAX #: 
[phone]      Exam Date: 2019           Reason: CHEST PAIN            
                             EXAMS:                                             
 CPT CODE:      [id number] XR CHEST 1 V                               [id number]      
                     REASON FOR EXAM: CHEST PAIN               EXAM ORDER DATE: 
2019 4:35 PM               Ordering M.D.: Ame Lamb MD            
   PROCEDURE:  - XR CHEST 1 V               COMPARISON:               FINDINGS: 
Portable AP frontal view of the chest obtained at 5:06 PM       shows clear 
lungs without evidence of consolidation. There is no       evidence of effusion.
The heart size is within normal limits.       Pulmonary vasculatures are 
unremarkable.                  IMPRESSION: No active disease.          ** 
Electronically Signed by KIMI Stacy on 2019 at 1711 **                  
   Reported and signed by: Eddi Stacy M.D.                       CC: 
Ame Lamb MD                                                          
                                                       Technologist: MARTITA WOOD, RT(R),CT                              Trnscrpt Data: 2019 (171
1) t.SDR.VTL                          Orig Print D/T: S: 2019 (2249)      
                  PAGE  1                       Signed Report                   
           [id number] 17:04:00* 





                Test Item       Value           Reference Range    Comments

 

                D-DIMER (test code=DDIMER)    < 100 ng/ml     < 600            





BASIC METABOLIC [id number] 17:00:00* 





                Test Item       Value           Reference Range    Comments

 

                SODIUM (test code=NA)    138 mmol/L      135-148          

 

                POTASSIUM (test code=K)    3.7 mmol/L      3.5-5.1          

 

                CHLORIDE (test code=CL)    103 mmol/L      101-109          

 

                CARBON DIOXIDE (test code=CO2)    24.7 mmol/L     21-32            

 

                ANION GAP (test code=GAP)    14 mmol/L       10-20            

 

                GLUCOSE (test code=GLU)    89 mg/dL                   

 

                BLOOD UREA NITROGEN (test code=BUN)    12 mg/dL        3-21             

 

                GLOMERULAR FILTRATION RATE (test code=GFR)    > 60 mL/min     >=60            Estimated GFR by 

using Modified MDRD formula.Chronic kidney disease is defined as either kidney 
damageor GFR <60 mL/min/1.73 m2 for >3 months.

 

                CREATININE (test code=CREAT)    0.96 mg/dL      0.55-1.3         

 

                BUN/CREATININE RATIO (test code=BUN/CREA)    12.5            10-20            

 

                CALCIUM (test code=CA)    9.5 mg/dL       8.4-10.2         





HEPATIC FUNCTION [id number] 17:00:00* 





                Test Item       Value           Reference Range    Comments

 

                TOTAL PROTEIN (test code=PROT)     gram/dL        6.4-8.2          

 

                ALBUMIN (test code=ALB)     g/dL           3.4-5.0          

 

                GLOBULIN (test code=GLOB)     g/dL           2.7-4.2          

 

                ALBUMIN/GLOBULIN RATIO (test code=A/G)                    0.75-1.50        

 

                BILIRUBIN TOTAL (test code=BILT)     mg/dL          0.2-1.2          

 

                BILIRUBIN DIRECT (test code=BILD)     mg/dL          0.0-0.20         

 

                SGOT/AST (test code=AST)     IUnit/L        15-37            

 

                SGPT/ALT (test code=ALT)     U/L            10-69            

 

                ALKALINE PHOSPHATASE TOTAL (test code=ALKP)     IUnit/L                   





[id number] 17:00:00* 





                Test Item       Value           Reference Range    Comments

 

                LIPASE (test code=LIP)     Unit/L         144-286          





[id number] 17:00:00* 





                Test Item       Value           Reference Range    Comments

 

                MAGNESIUM (test code=MAG)     mg/dL          1.8-2.4          





CPK-MB [id number] 17:00:00* 





                Test Item       Value           Reference Range    Comments

 

                CREATINE KINASE (CK) (test code=CK)     IUnit/L                   

 

                CKMB (test code=CKMBT)     ng/mL          0-6.0            

 

                RELATIVE % INDEX (test code=REL%)     %                               





[id number] 17:00:00* 





                Test Item       Value           Reference Range    Comments

 

                TROPONIN-I (test code=TROPI)     ng/mL          0-0.045          





CBC W/O [id number] 16:49:00* 





                Test Item       Value           Reference Range    Comments

 

                WHITE BLOOD CELL (test code=WBC)    10.3 K/mm3      4.5-12.5         

 

                RED BLOOD CELL (test code=RBC)    5.66 mill/mm3    4.0-5.8          

 

                HEMOGLOBIN (test code=HGB)    16.7 gram/dL    13.0-17.5        

 

                HEMATOCRIT (test code=HCT)    48.7 %          42.0-52.0        

 

                MEAN CELL VOLUME (test code=MCV)    86.0 fL         80-98            

 

                MEAN CELL HGB (test code=MCH)    29.5 picogram    27.0-33.0        

 

                MEAN CELL HGB CONCETRATION (test code=MCHC)    34.3 gram/dL    33.0-36.0        

 

                RED CELL DISTRIBUTION WIDTH (test code=RDW)    14.3 %          11.6-16.2        

 

                RED CELL DISTRIBUTION WIDTH SD (test code=RDW-SD)    44.9 fL         39.2-49.5        

 

                PLATELET COUNT (test code=PLT)    319 K/mm3       150-450          

 

                MEAN PLATELET VOLUME (test code=MPV)    9.4 fL          6.7-11.0         





- XR RIBS BI 3 -12-09 23:52:00  Name: CARLITO ARIAS                     
[facility]    : 1985 Age/S:33  /M            [address] 
[facility]           Unit#:[id number]     [place] [address]               Phys: Waldemar Rivas MD                                  
               Acct#: [id number] Dis Date:                   PHONE #: 
[phone]      Status: REG ER                                   FAX #: 
[phone]      Exam Date: 2019           Reason: FAll rib pain         
                             EXAMS:                                             
 CPT CODE:      [id number] XR RIBS BI 3 V                             83158      
             R16               EXAM:  - XR RIBS BI 3 V               HISTORY: 
FAll rib pain               COMPARISON: None               FINDINGS:       No 
displaced rib fracture. No aggressive osseous lesion.       The lungs are clear.
No pleural effusion or pneumothorax. The cardiac       silhouette is within 
normal limits.                 IMPRESSION:         No rib abnormalities.        
No acute cardiopulmonary disease.          ** Electronically Signed by Daniel Gill MD on 2019 at 3048 **                      Reported and signed by: 
Daniel Gill MD                        CC: Waldemar Rivas MD                    
                                                                                
                 Technologist: JOAN WATERS RT(R),CT                       
     Trnscrpt Data: 2019 (7366) tMARGO.VB7                          Orig 
Print D/T: S: 2019 (1784)                         PAGE  1                 
     Signed Report                               CT BRAIN WO    [facility]   [address] [address]      Patient Name: CARLITO ARIAS   MR #: [id number]    : 1985 
Age/Sex: 32/M  Acct #: [id number] Req #: [id number]  Adm Physician:     
Ordered by: VELMA HOLMAN NP  Report #: [id number]   Location: ER  Room/Bed: 
   ______________________________________________________________________
_____________________________    Procedure: [id number] CT/CT BRAIN WO  Exam Date:
17                            Exam Time: 1730       REPORT STATUS: Signed 
  History: Fall   Comparison studies: CT brain from 2015 and 2014.  
      Technique:    Axial images were obtained from the skull base to the dorian
yelena.     Coronal and sagittal reconstructions obtained from the axial data.     
Findings:      Scalp/skull:    No abnormalities. No fractures, blastic or lytic 
lesions.      Extra-axial spaces:    No masses.  No fluid collections.      Bra
in sulci: Appropriate for age.   Ventricles: Normal in size and configuration. N
o hydrocephalus.      Parenchyma:    No abnormal densities.    No masses, hemorr
indra, acute or chronic cortical vascular insults.      Sellar/suprasellar region
: No abnormalities   Craniocervical junction: Patent foramen magnum.  No Chiari 
one malformation.      IMPRESSION:      No intracranial abnormalities.      A pr
eliminary report was given by Neuroradiology fellow Dr. Blackburn at 6:11 PM   on 2017.      I have reviewed the study and agree with the findings in the pre
liminary   report.      Signed by: Dr. Mary Kim M.D. on 2017 7:52 
PM        Dictated By: MARY KIM MD  Electronically Signed By: MARY PABON MD on 17  Transcribed By: ELYSSA on 17       COPY TO
:   VELMA HOLMAN NP        CT CERVICAL SPINE WO    [place] [address]      
Patient Name: CARLITO ARIAS   MR #: [id number]    : 1985 Age/Sex: 
32/M  Acct #: [id number] Req #: [id number]  Adm Physician:     Ordered by: 
VELMA HOLMAN NP  Report #: [id number]   Location: ER  Room/Bed:     
______________________________________________________________________
_____________________________    Procedure: [id number] CT/CT CERVICAL SPINE WO  E
xam Date: 17                            Exam Time: 1730       REPORT STATU
S: Signed    History: Fall   Comparison studies: None      Technique:    Axial i
mages were obtained through the cervical region..   Coronal and sagittal images 
reconstructed from the axial data..   Intravenous contrast: None      Findings: 
    Airway: Patent.      Fractures: None.   Soft tissues: No gross abnormalitie
s.      Atlantoaxial articulation: Intact.   Alignment: Normal lordosis. No scol
iosis.   Cervicomedullary junction: No abnormalities. The foramen magnum is rivera
nt.      Vertebrae:    No infection or neoplasm.      Degenerative changes:    N
one.      IMPRESSION:      1. No acute abnormalities.      2. Ligament, spinal c
ord and or vascular abnormalities cannot be excluded on   the basis of this exam
ination.         A preliminary report was given by Neuroradiology fellow Dr. Steven gomez at 6:14 PM   on 2017.      I have reviewed the study and agree with parker tavares findings in the preliminary   report.      Signed by: STEPH Thacker on 2017 7:55 PM        Dictated By: MARY KIM MD  Electronically S
igned By: MARY KIM MD on 17  Transcribed By: ELYSSA on 1955       COPY TO:   VELMA HOLMAN NP

## 2019-07-06 NOTE — XMS REPORT
Clinical Summary

                             Created on: 2019



Odilon Khoury

External Reference #: [id number]

: 1985

Sex: Male



Demographics







                          Address                   [address] [place] 

[place]  [address]

 

                          Home Phone                [phone]

 

                          Preferred Language        English

 

                          Marital Status            Single

 

                          Jain Affiliation     [id number]

 

                          Race                      White

 

                          Ethnic Group              Non-





Author







                          Author                    [facility]

 

                          Organization              [facility] [employer]

 

                          Address                   Unknown

 

                          Phone                     Unavailable







Support







                Name            Relationship    Address         Phone

 

                Nini Cordova            Unknown         [phone]







Care Team Providers







                    Care Team Member Name    Role                Phone

 

                    Asked, No Pcp       PCP                 Unavailable







Allergies







                                        Comments



                 Active Allergy     Reactions       Severity        Noted Date 

 

                                        



Throat swelling



                     Dicyclomine         Swelling            2017 

 

                                         



                           Ketorolac                 2017 







Medications







                          End Date                  Status



              Medication     Sig          Dispensed     Refills      Start  



                                         Date  

 

                                                    Active



                     gabapentin (NEURONTIN)     Take 300 mg         0   



                           300 mg capsule            by mouth 2     



                                         (two) times a     



                                         day.     

 

                                                    Active



                     famotidine (PEPCID) 20 MG     Take 20 mg by       0   



                           tablet                    mouth 2 (two)     



                                         times a day.     







Active Problems







 



                           Problem                   Noted Date

 

 



                           Seizure                   2017

 

 



                           Convulsions               2017







Social History







                                        Date



                 Tobacco Use     Types           Packs/Day       Years Used 

 

                                         



                                         Smoker, Current Status    



                                         Unknown    









   



                 Alcohol Use     Drinks/Week     oz/Week         Comments

 

   



                                         No   









 



                           Sex Assigned at Birth     Date Recorded

 

 



                                         Not on file 









                                        Industry



                           Job Start Date            Occupation 

 

                                        Not on file



                           Not on file               Not on file 









                                        Travel End



                           Travel History            Travel Start 

 





                                         No recent travel history available.







Last Filed Vital Signs

Not on file



Plan of Treatment







   



                 Health Maintenance     Due Date        Last Done       Comments

 

   



                           INFLUENZA VACCINE         2019  







Results

Not on fileafter 2018



Advance Directives





Patient has advance care planning documents on file. For more information, martita tavares contact:



Michael Hendrickson



[address] [place] [address]

## 2019-07-06 NOTE — XMS REPORT
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   

 

    



                           2018                Pharmacy Visit   

 

    



                           2018                Pharmacy Visit   

 

    



                           2018                Pharmacy Visit   

 

    



                           2018                Pharmacy Visit   

 

    



                           2018                Pharmacy Visit   

 

    



              2018     Refill       [facility] Dylan Allen MD     Acute non-recurrent





                                         maxillary sinusitis;



                                         Cough

 

    



                           2018                Pharmacy Visit   

 

    



                           2018                Pharmacy Visit   

 

    



                           2018                Pharmacy Visit   

 

    



              2018     Orders Only     Family Practice     Angelica Thompson NP     Seizure (Primary

 Dx)

 

    



                           2018                Pharmacy Visit   

 

    



              03/15/2018     Office Visit     Family Arlene Shore MD     Preventative health

 care



                           Angelica Thompson NP      (Primary Dx);



                                         Acute non-recurrent



                                         maxillary sinusitis;



                                         Cough;



                                         Combined systolic and



                                         diastolic congestive



                                         heart failure,



                                         unspecified congestive



                                         heart failure chronicity;





                                         Sinus tachycardia;



                                         Seizures;



                                         Gastroesophageal reflux



                                         disease without



                                         esophagitis;



                                         Pain in thoracic spine at



                                         multiple sites

 

    



                           03/15/2018                Pharmacy Visit   

 

    



                           2018                Pharmacy Visit   

 

    



              2018     Refill       Family Practice     Arlene Paez MD     RLQ abdominal pain;



                                         Right upper quadrant



                                         abdominal pain

 

    



              2018     Refill       Family Arlene Shore MD     Heart palpitations;



                                         Chronic intractable



                                         headache, unspecified



                                         headache type;



                                         Combined systolic and



                                         diastolic congestive



                                         heart failure,



                                         unspecified congestive



                                         heart failure chronicity;





                                         Sinus tachycardia;



                                         Right upper quadrant



                                         abdominal pain;



                                         RLQ abdominal pain

 

    



                           2018                Pharmacy Visit   



after 10/03/2017



Immunizations







  



                     Name                Dates Previously Given     Next Due

 

  



                           Azithromycin 250mg Tab In     2016 



                                         Clinic  

 

  



                           Influenza Vaccine         10/26/2016 (Deferred: Patient Refused), 2016 



                                         (Deferred: Patient Refused), 2016 (Deferred: 



                                         Patient Refused), 2015 (Deferred: Patient 



                                         Refused), 2015 (Deferred: Patient Refused) 

 

  



                           Tdap Tetanus, diphtheria,     2013 



                                         acellular pertussis  



                                         Vaccine  







Family History







   



                 Medical History     Relation        Name            Comments

 

   



                           Hypertension              Mother  

 

   



                     Other               Mother              migraine, depression









   



                 Relation        Name            Status          Comments

 

   



                           Brother                   Alive 

 

   



                           Father                    Alive 

 

   



                           Maternal Grandfather       

 

   



                           Maternal Grandmother       

 

   



                           Mother                    Alive 

 

   



                           Paternal Grandfather       

 

   



                           Paternal Grandmother      Alive 

 

   



                           Sister                    Alive 

 

   



                           Sister                    Alive 

 

   



                           Sister                    Alive 

 

   



                           Son                       Alive 







Social History







    



              Tobacco Use     Types        Packs/Day     Years Used     Date

 

    



                 Light Tobacco Smoker     Cigarettes      0.5             10 

 

    



                                         Smokeless Tobacco: Never   



                                         Used   









                                        Tobacco Cessation: Ready to Quit: Yes; Counseling Given: Yes

Comments: trying to quit









   



                 Alcohol Use     Drinks/Week     oz/Week         Comments

 

   



                 No              0 Standard      0.0             none ETOH for four years (2016)



                                         drinks or  



                                         equivalent  









 



                           Sex Assigned at Birth     Date Recorded

 

 



                                         Not on file 







Last Filed Vital Signs







  



                     Vital Sign          Reading             Time Taken

 

  



                     Blood Pressure      124/81              2018  9:19 AM CDT

 

  



                     Pulse               121                 2018  9:19 AM CDT

 

  



                     Temperature         36.9 C (98.4 F)     2018  9:19 AM CDT

 

  



                     Respiratory Rate     18                  2018  9:19 AM CDT

 

  



                     Oxygen Saturation     96%                 2018  9:19 AM CDT

 

  



                     Inhaled Oxygen      -                   -



                                         Concentration  

 

  



                     Weight              77.9 kg (171 lb 12.8 oz)     2018  9:19 AM CDT

 

  



                     Height              175.3 cm (5' 9")     2018  9:19 AM CDT

 

  



                     Body Mass Index     25.37               2018  9:19 AM CDT







Plan of Treatment







    



              Date         Type         Specialty     Care Team     Description

 

    



              10/09/2018     Office Visit     Family Practice     Arlene Paez MD     [address] [address] [phone] (Fax) 







Procedures







    



              Procedure Name     Priority     Date/Time     Associated Diagnosis     Comments

 

    



              TRANSTHORACIC ECHO (TTE)     Routine      2018     Combined systolic and     Results

 for this



                     1:34 PM CDT         diastolic congestive     procedure are in the



                           heart failure,            results section.



                                         unspecified HF chronicity 

 

    



              TSH          Routine      2018     Preventative health care     Results for this



                           12:58 PM CDT              procedure are in the



                                         results section.

 

    



              HEMOGLOBIN A1C     Routine      2018     Preventative health care     Results for 

this



                           12:58 PM CDT              procedure are in the



                                         results section.

 

    



              CBC/DIFF     Routine      2018     Preventative health care     Results for this



                           12:58 PM CDT              procedure are in the



                                         results section.

 

    



              BASIC METABOLIC PANEL     Routine      2018     Preventative health care     Results

 for this



                           12:58 PM CDT              procedure are in the



                                         results section.

 

    



              LIVER PROFILE     Routine      2018     Preventative health care     Results for this





                           12:58 PM CDT              procedure are in the



                                         results section.

 

    



              LIPID PROFILE     Routine      2018     Preventative health care     Results for this





                           12:58 PM CDT              procedure are in the



                                         results section.

 

    



              12 LEAD EKG     Routine      2018     Chest pain, unspecified     Results for this





                     10:56 AM CDT        type                procedure are in the



                                         results section.



after 10/03/2017



Results

* TRANSTHORACIC ECHO (TTE) (2018  1:34 PM)





   



                 Component       Value           Ref Range       Performed At

 

   



                     TRANSTHORACIC ECHO (TTE)     [facility]       [facility]



                                         Echo Report  



                                         [facility]  



                                         Age:32  



                                         Gender: M  



                                         :1985  



                                         MRN:[id number]  



                                         Exam Date:  



                                         2018  



                                           



                                         13:34  



                                         Exam Location: [facility]  



                                         Ordering Phys:  



                                         ARLENE PAEZ  



                                         Referring  



                                         Phys:ARLENE PAEZ  



                                         Reading  



                                         Phys:Tuan Farias MD  



                                         Fellow Phys:  



                                         Micky Espana M.D.  



                                         Fellow Phys:  



                                         Sonographer:  



                                         Shama Samano  



                                         Reason For Exam:  



                                         Indications:  



                                         worsening symptoms of SOB,  



                                         NYHA  



                                           



                                          III,  



                                         tachycardia  



                                         ICD-9 Codes:  



                                         Exam Type:  



                                         TRANSTHORACIC ECHO (TTE)  



                                         Procedure  



                                         CPT:72225  



                                         Addtional CPT:  



                                         Ht (in):  



                                         69 BSA:  



                                         1.95HR:  



                                         104  



                                         Rhythm: Sinus rhythm  



                                         Wt (lb):  



                                         171BP:  



                                         137/ 93  



                                         Technical  



                                         Quality: Good  



                                         History:  



                                         MEASUREMENTS(Male /  



                                         Female) Normal Values  



                                         2D ECHO  



                                         Body Surface  



                                         Area  



                                          2  



                                         m  



                                           



                                         LV Diastolic Diameter  



                                         PLAX4.7  



                                         cm  



                                         4.2 - 5.9 / 3.9 - 5.3 cm  



                                         LV Systolic Diameter  



                                         PLAX 3.2  



                                         cm  



                                         2.1 - 4.0 cm  



                                         LV Fractional Shortening  



                                         PLAX 32.1  



                                         %  



                                         25 - 46%  



                                         IVS Diastolic  



                                         Thickness  



                                         1.1  



                                         cm  



                                           



                                         IVS Systolic  



                                         Thickness  



                                         1.5  



                                         cm  



                                           



                                         LVPW Diastolic  



                                         Thickness1  



                                         .1  



                                         cm  



                                           



                                         LVPW Systolic  



                                         Thickness  



                                         1.4  



                                         cm  



                                           



                                         LV Relative Wall  



                                         Thickness0.46  



                                           



                                           



                                         LVOT  



                                         Diameter  



                                          2.1  



                                         cm  



                                           



                                         Aortic Root  



                                         Diameter  



                                         3.4  



                                         cm  



                                           



                                         LV Diastolic Volume MOD  



                                         BP93.7  



                                         cm  



                                         67 - 155 / 56 - 104 cm  



                                         LV Systolic Volume MOD  



                                         BP 43.7  



                                         cm  



                                         22 - 58 / 19 - 49 cm  



                                         LV Ejection Fraction MOD  



                                         BP 53.4  



                                         %  



                                         >=55%  



                                         LV Stroke Volume MOD  



                                         BP 50  



                                         cm  



                                           



                                         LV Cardiac Output MOD  



                                         [id number]  



                                         cm/min  



                                         LV Cardiac Index MOD  



                                         BP 2662  



                                         cm/minm  



                                         LA  



                                         Volume  



                                          44.7  



                                         cm  



                                         18 - 58 / 22 - 52 cm  



                                         LA Volume  



                                         Index  



                                          22.9  



                                         cm/m  



                                         16 - 28 cm/m  



                                         DOPPLER  



                                         AV Peak  



                                         Velocity  



                                         109  



                                         cm/s  



                                           



                                         AV Peak  



                                         Gradient  



                                         4.7  



                                         mmHg  



                                           



                                         AV Mean  



                                         Velocity  



                                         70  



                                         cm/s  



                                           



                                         AV Mean  



                                         Gradient  



                                         2.3  



                                         mmHg  



                                           



                                         AV Velocity Time  



                                         Integral 17.8  



                                         cm  



                                           



                                         LVOT Peak  



                                         Velocity  



                                         83  



                                         cm/s  



                                           



                                         LVOT Peak  



                                         Gradient  



                                         2.8  



                                         mmHg  



                                           



                                         LVOT Mean  



                                         Velocity  



                                         58.9  



                                         cm/s  



                                           



                                         LVOT Mean  



                                         Gradient  



                                         1.6  



                                         mmHg  



                                           



                                         LVOT Velocity Time  



                                         Integral 14.7  



                                         cm  



                                           



                                         LVOT Stroke  



                                         Volume  



                                         52.5  



                                         cm  



                                           



                                         AV Area Cont Eq  



                                         vti  



                                          3  



                                         cm  



                                           



                                         AV Area Cont Eq  



                                         pk  



                                         2.7  



                                         cm  



                                           



                                         Mitral E Point  



                                         Velocity  



                                         62  



                                         cm/s  



                                           



                                         Mitral A Point  



                                         Velocity  



                                         54  



                                         cm/s  



                                           



                                         Mitral E to A  



                                         Ratio  



                                           



                                         1.1  



                                           



                                         MV Deceleration  



                                         Thomas  



                                         285  



                                         cm/s  



                                           



                                         MV Deceleration  



                                         Time  



                                         217  



                                         ms  



                                           



                                         PV Peak  



                                         Velocity  



                                         93.5  



                                         cm/s  



                                           



                                         PV Peak  



                                         Gradient  



                                         3.5  



                                         mmHg  



                                           



                                         RVOT Peak  



                                         Velocity  



                                         69.4  



                                         cm/s  



                                           



                                         RVOT Peak  



                                         Gradient  



                                         1.9  



                                         mmHg  



                                           



                                         LV E' Lateral  



                                         Velocity  



                                         9.2  



                                         cm/s  



                                           



                                         Mitral E to LV E' Lateral  



                                         Ratio  



                                         6.8  



                                           



                                         LV E' Septal  



                                         Velocity  



                                          7  



                                         cm/s  



                                           



                                         Mitral E to LV E' Septal  



                                         Ratio8.8  



                                           



                                         FINDINGS  



                                         Left Ventricle  



                                         The left ventricle is normal  



                                         in size. Upper-normal LV wall  



                                         thickness. LV  



                                         systolic function is low  



                                         normal. LVEF is 50-54%. There  



                                         are no regional wall  



                                         motion  



                                         abnormalities.There is  



                                         normal LV relaxation with  



                                         normal filling  



                                         pressures.  



                                         Right Ventricle  



                                         The right ventricle is normal  



                                         in size and systolic function.  



                                         TAPSE=1.8 cm.  



                                         Right Atrium  



                                         The right atrium is normal in  



                                         size.  



                                         Left Atrium  



                                         The left atrium is normal in  



                                         size.  



                                         IAS  



                                         Mitral Valve  



                                         Structurally normal mitral  



                                         valve without significant  



                                         stenosis or prolapse.  



                                         There is no mitral  



                                         regurgitation.  



                                         Aortic Valve  



                                         The aortic valve is  



                                         trileaflet and structurally  



                                         normal. There is no aortic  



                                         stenosis. There is no aortic  



                                         regurgitation.  



                                         Tricuspid Valve  



                                         Structurally normal tricuspid  



                                         valve without significant  



                                         stenosis. There is  



                                         trace tricuspid  



                                         regurgitation.Insufficient  



                                         TR jet to estimate  



                                         pulmonary  



                                         artery systolic pressure.  



                                         Pulmonic Valve  



                                         Structurally normal pulmonic  



                                         valve without significant  



                                         stenosis. There is trace  



                                         pulmonic regurgitation.  



                                         Pericardium  



                                         No pericardial effusion.  



                                         Aorta  



                                         Normal aortic root for body  



                                         surface area.  



                                         IVC  



                                         The inferior vena cava is  



                                         normal in size.  



                                         CONCLUSIONS  



                                         The left ventricle is normal  



                                         in size. Upper-normal LV wall  



                                         thickness.  



                                         LV systolic function is low  



                                         normal. LVEF is 50-54%.  



                                         There are no regional wall  



                                         motion abnormalities.  



                                         There is normal LV relaxation  



                                         with normal filling pressures.

  



                                         The right ventricle is  



                                         normal in size and systolic  



                                         function.  



                                         Atria are normal size.  



                                         No significant valvular  



                                         abnormalities.  



                                         Insufficient TR jet to  



                                         estimate pulmonary artery  



                                         systolic pressure.  



                                         No pericardial effusion.  



                                         When compared to prior study  



                                         from 2016, the LVEF is  



                                         improved, no regional  



                                         wall motion abnormalities are  



                                         identified  



                                         Tuan Farias MD

  



                                         (Electronically Signed)  



                                         Final Date:2018  



                                           



                                         17:11  



                                         2D ECHO  



                                         Body Surface  



                                         Area  



                                          2  



                                         m  



                                           



                                         LV Diastolic Diameter  



                                         PLAX4.7  



                                         cm  



                                         4.2 - 5.9 / 3.9 - 5.3 cm  



                                         LV Systolic Diameter  



                                         PLAX 3.2  



                                         cm  



                                         2.1 - 4.0 cm  



                                         LV Fractional Shortening  



                                         PLAX 32.1  



                                         %  



                                         25 - 46%  



                                         IVS Diastolic  



                                         Thickness  



                                         1.1  



                                         cm  



                                           



                                         IVS Systolic  



                                         Thickness  



                                         1.5  



                                         cm  



                                           



                                         LVPW Diastolic  



                                         Thickness1  



                                         .1  



                                         cm  



                                           



                                         LVPW Systolic  



                                         Thickness  



                                         1.4  



                                         cm  



                                           



                                         LV Relative Wall  



                                         Thickness0.46  



                                           



                                           



                                         LVOT  



                                         Diameter  



                                          2.1  



                                         cm  



                                           



                                         Aortic Root  



                                         Diameter  



                                         3.4  



                                         cm  



                                           



                                         LV Diastolic Volume MOD  



                                         BP93.7  



                                         cm  



                                         67 - 155 / 56 - 104 cm  



                                         LV Systolic Volume MOD  



                                         BP 43.7  



                                         cm  



                                         22 - 58 / 19 - 49 cm  



                                         LV Ejection Fraction MOD  



                                         BP 53.4  



                                         %  



                                         >=55%  



                                         LV Stroke Volume MOD  



                                         BP 50  



                                         cm  



                                           



                                         LV Cardiac Output MOD  



                                         [id number]  



                                         cm/min  



                                         LV Cardiac Index MOD  



                                         BP 2662  



                                         cm/minm  



                                         LA  



                                         Volume  



                                          44.7  



                                         cm  



                                         18 - 58 / 22 - 52 cm  



                                         LA Volume  



                                         Index  



                                          22.9  



                                         cm/m  



                                         16 - 28 cm/m  



                                         DOPPLER  



                                         AV Peak  



                                         Velocity  



                                         109  



                                         cm/s  



                                           



                                         AV Peak  



                                         Gradient  



                                         4.7  



                                         mmHg  



                                           



                                         AV Mean  



                                         Velocity  



                                         70  



                                         cm/s  



                                           



                                         AV Mean  



                                         Gradient  



                                         2.3  



                                         mmHg  



                                           



                                         AV Velocity Time  



                                         Integral 17.8  



                                         cm  



                                           



                                         LVOT Peak  



                                         Velocity  



                                         83  



                                         cm/s  



                                           



                                         LVOT Peak  



                                         Gradient  



                                         2.8  



                                         mmHg  



                                           



                                         LVOT Mean  



                                         Velocity  



                                         58.9  



                                         cm/s  



                                           



                                         LVOT Mean  



                                         Gradient  



                                         1.6  



                                         mmHg  



                                           



                                         LVOT Velocity Time  



                                         Integral 14.7  



                                         cm  



                                           



                                         LVOT Stroke  



                                         Volume  



                                         52.5  



                                         cm  



                                           



                                         AV Area Cont Eq  



                                         vti  



                                          3  



                                         cm  



                                           



                                         AV Area Cont Eq  



                                         pk  



                                         2.7  



                                         cm  



                                           



                                         Mitral E Point  



                                         Velocity  



                                         62  



                                         cm/s  



                                           



                                         Mitral A Point  



                                         Velocity  



                                         54  



                                         cm/s  



                                           



                                         Mitral E to A  



                                         Ratio  



                                           



                                         1.1  



                                           



                                         MV Deceleration  



                                         Thomas  



                                         285  



                                         cm/s  



                                           



                                         MV Deceleration  



                                         Time  



                                         217  



                                         ms  



                                           



                                         PV Peak  



                                         Velocity  



                                         93.5  



                                         cm/s  



                                           



                                         PV Peak  



                                         Gradient  



                                         3.5  



                                         mmHg  



                                           



                                         RVOT Peak  



                                         Velocity  



                                         69.4  



                                         cm/s  



                                           



                                         RVOT Peak  



                                         Gradient  



                                         1.9  



                                         mmHg  



                                           



                                         LV E' Lateral  



                                         Velocity  



                                         9.2  



                                         cm/s  



                                           



                                         Mitral E to LV E' Lateral  



                                         Ratio  



                                         6.8  



                                           



                                         LV E' Septal  



                                         Velocity  



                                          7  



                                         cm/s  



                                           



                                         Mitral E to LV E' Septal  



                                         Ratio8.8  



                                           









   



                 Performing Organization     Address         City/[facility] [place]     Phone Number

 

   



                                         [facility]   





* HEMOGLOBIN A1C (2018 12:58 PM)





   



                 Component       Value           Ref Range       Performed At

 

   



                 Hemoglobin A1c     5.5             4.3 - 6.1 %     [facility]



                                         IMMUNOLOG[facility]

 

   



                 Est Average Gluc     111.2           mg/dL           [facility]



                                         [facility]













                                         Specimen

 





                                         Blood









   



                 Performing Organization     Address         City/State/[facility] [place]     Phone Number

 

   



                                         [facility]   

 

   



                                         [facility]   





* TSH (2018 12:58 PM)





   



                 Component       Value           Ref Range       Performed At

 

   



                 TSH             0.41 (L)        0.57 - 3.74 uIU/mL     [facility]













                                         Specimen

 





                                         Blood









   



                 Performing Organization     Address         City/State[place] [place]     Phone Number

 

   



                                         [facility]   

 

   



                                         [facility]   





* LIVER PROFILE (2018 12:58 PM)





   



                 Component       Value           Ref Range       Performed At

 

   



                 T Protein       7.2             6.0 - 8.3 g/dL     [facility]

 

   



                 Albumin         4.4             4.2 - 5.5 g/dL     [facility]

 

   



                 T Bilirubin     0.4             0.2 - 1.2 mg/dL     [facility]

 

   



                 Alk Phos        69              34 - 104 U/L     [facility]

 

   



                 AST             17              13 - 39 U/L     [facility]

 

   



                 ALT             22              7 - 52 U/L      [facility]

 

   



                 D Bilirubin     0.1             0.0 - 0.2 mg/dL     [facility]













                                         Specimen

 





                                         Blood









   



                 Performing Organization     Address         City/State[place] [place]     Phone Number

 

   



                                         [facility]   

 

   



                                         [facility]   





* LIPID PROFILE (2018 12:58 PM)





   



                 Component       Value           Ref Range       Performed At

 

   



                 Cholesterol     184             mg/dL           [facility]



                                         Comment:  



                                         REFERENCE RANGE:  



                                         Desirable: <200 mg/dL  



                                         Borderline: 200-240 mg/dL  



                                         High Risk: >240 mg/dL  

 

   



                 Triglyceride     70              <150 mg/dL      [facility]



                                         Comment:  



                                         REFERENCE RANGE:  



                                         Normal: <150 mg/dL  



                                         Borderline High: 150-199 mg/dL  



                                         High: 200-499 mg/dL  



                                         Very High: >te=151 mg/dL  

 

   



                 HDL             51              mg/dL           [facility]



                                         Comment:  



                                         Increased CHD risk: <40 mg/dL  



                                         Decreased CHD risk: >60 mg/dL  

 

   



                 LDL             119             mg/dL           [facility]



                                         Comment:  



                                         REFERENCE RANGE:  



                                         Optimal: <100 mg/dL  



                                         Near Optimal: 100-129 mg/dL  



                                         Borderline High: 130-159 mg/dL  



                                         High: 160-189 mg/dL  



                                         Very High: >rf=476 mg/dL  













                                         Specimen

 





                                         Blood









   



                 Performing Organization     Address         City/State/Zipcode     Phone Number

 

   



                                         [facility]   

 

   



                                         [facility]   





* CBC/DIFF (2018 12:58 PM)





   



                 Component       Value           Ref Range       Performed At

 

   



                 WBC             12.1 (H)        4.5 - 12.0 K/uL     [facility]

 

   



                 RBC             5.85            4.60 - 6.20 M/uL     [facility]

 

   



                 Hemoglobin      17.5            14.0 - 18.0 g/dL     [facility]

 

   



                 Hematocrit      52.5            40.0 - 54.0 %     [facility]

 

   



                 MCV             90              82 - 92 fL      [facility]

 

   



                 MCH             29.9            27.0 - 31.0 pg     [facility]

 

   



                 MCHC            33.3            32.0 - 36.0 g/dL     [facility]

 

   



                 RDW             44.4 (H)        35.1 - 43.9 fL     [facility]

 

   



                 Platelet        386             150 - 400 K/uL     [facility]

 

   



                 Mean Platelet Volume     10.0            9.4 - 12.4 fL     [facility]

 

   



                     Percent NRBC        0.0                 [facility]

 

   



                     Absolute NRBC       0.00                [facility]

 

   



                 Neutrophil      75.5 (H)        34.0 - 67.9 %     [facility]

 

   



                 Lymphocyte      16.1 (L)        21.8 - 50.0 %     [facility]

 

   



                 Monocyte        6.3             5.3 - 12.0 %     [facility]

 

   



                 Eosinophil      1.2             0.8 - 5.0 %     [facility]

 

   



                 Basophil        0.5             0.2 - 1.2 %     [facility]

 

   



                 Pct Immat Gran     0.4             0.0 - 0.5       [facility]

 

   



                 Neutrophil, Abs     9.14 (H)        1.78 - 5.36 K/uL     [facility]

 

   



                 Lymphocyte, Abs     1.95            1.32 - 3.57 K/uL     [facility]

 

   



                 Monocyte, Abs     0.76            0.30 - 0.82 K/uL     [facility]

 

   



                 Eosinophil, Abs     0.15            0.04 - 0.54 K/uL     [facility]

 

   



                 Basophil, Abs     0.06            0.01 - 0.08 K/uL     [facility]

 

   



                 Absol Immat Gran     0.05 (H)        0.00 - 0.03 K/uL     [facility]













                                         Specimen

 





                                         Blood









   



                 Performing Organization     Address         City/[facility] [place]     Phone Number

 

   



                                         [facility]   

 

   



                                         [facility]   





* BASIC METABOLIC PANEL (2018 12:58 PM)





   



                 Component       Value           Ref Range       Performed At

 

   



                 CO2             26              21 - 31 mmol/L     [facility]

 

   



                 Chloride        103             98 - 107 mmol/L     [facility]

 

   



                 Potassium       4.8             3.5 - 5.1 mmol/L     [facility]

 

   



                 Sodium          137             136 - 145 mmol/L     [facility]

 

   



                 Glucose         110             70 - 110 mg/dL     [facility]

 

   



                 Urea Nitrogen     11              7 - 25 mg/dL     [facility]

 

   



                 Creatinine      0.90            0.7 - 1.3 mg/dL     [facility]

 

   



                     Anion Gap           8                   [facility]

 

   



                 Calcium         10.3            8.6 - 10.3 mg/dL     [facility]

 

   



                 GFR, Estimated     >60             mL/min/1.73 m2     [facility]

 

   



                 GFR, Estim, Afr-Am     >60             mL/min/1.73 m2     [facility]













                                         Specimen

 





                                         Blood









   



                 Performing Organization     Address         City[place] [place]     Phone [place]

 

   



                                         [facility]   

 

   



                                         [facility]   





* 12 LEAD EKG (2018 10:56 AM)





   



                 Component       Value           Ref Range       Performed At

 

   



                     12 LEAD EKG FOR [facility]  



                                           



                                           



                                           



                                           



                                           



                                          Test  



                                         Date:2018  



                                         Pat Name: ODILON ARIAS  



                                         Department:  



                                         Patient ID:  



                                         [id number]  



                                         Room:  



                                           



                                         Gender:  



                                         M  



                                         Technician:  



                                          [id number]  



                                         :-  



                                          Requested By:  



                                         Order  



                                         Number:  



                                         NIGEL munoz MD: Lucas Santiago M.D.  



                                           



                                           



                                          Measurements  



                                         Intervals  



                                           



                                         Axis  



                                           



                                         Rate:  



                                         92  



                                           



                                         P:65  



                                         CA:  



                                         128  



                                         QRS:  



                                         78  



                                         QRSD:  



                                         74  



                                           



                                         T:49  



                                         QT:  



                                         334  



                                           



                                           



                                         QTc:413  



                                           



                                           



                                           



                                           



                                           



                                         Interpretive Statements  



                                         Normal sinus rhythm  



                                         Normal ECG  



                                         Electronically Signed On  



                                         18 17:05:02 CDT by  



                                         Lucas Santiago M.D.  









   



                 Performing Organization     Address         City/State/Zipcode     Phone Number

 

   



                                         [facility]   





after 10/03/2017

## 2019-07-06 NOTE — XMS REPORT
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                    

 

                          Tramadol 50 Mg Tablet Ultram 50 Mg Tablet                            Take 1 tablet

 by mouth every 12 hours.                            Oral                            

Inactive                                                                             

                          2018                            [facility]                    

 

                          traMADol (ULTRAM) 50 mg tablet                            Take 1 tablet by mouth every

 12 hours.                            Oral                            Inactive       

                                                                             2018

                                        [facility]                    

 

                          Gabapentin 300 Mg Capsule                            Take 1 capsule by mouth 2 times

 daily.                            Oral                            Active            

                                                                            2018     

                                        [facility]                    

 

                          gabapentin (NEURONTIN) 300 mg capsule                            Take 1 capsule by

 mouth 2 times daily.                            Oral                            No Longer

 Active                                                                              

                          2018                            [facility]                    

 

                          Lisinopril 10 Mg Tablet Prinivil 10 Mg Tablet                            Take 1 tablet

 by mouth daily.                            Oral                            No Longer

 Active                                                                              

                          2018                            [facility]                    

 

                          Carvedilol 25 Mg Tablet Coreg 25 Mg Tablet                            Take 1 tablet

 by mouth 2 times daily (with meals).                            Oral              

                    No Longer Active                                                         

                           2018                            [facility]       

             

 

                          Furosemide 20 Mg Tablet Lasix 20 Mg Tablet                            Take 1 tablet

 by mouth daily.                            Oral                            Active   

                                                                                 2018

                                        [facility]                    

 

                          Tramadol 50 Mg Tablet Ultram 50 Mg Tablet                            Take 1 tablet

 by mouth every 12 hours.                            Oral                            

No Longer Active                                                                     

                          2018                            [facility]                   

 

 

                          Zolpidem 5 Mg Tablet Ambien 5 Mg Tablet                            Take 1 tablet by

 mouth nightly at bedtime as needed for Insomnia.                            Oral  

                          No Longer Active                                           

                                                2018                            [facility]

                    

 

                          lisinopril (PRINIVIL) 10 mg tablet                            Take 1 tablet by mouth

 daily.                            Oral                            No Longer Active  

                                                                                  2018

                                        [facility]                    

 

                          carvedilol (COREG) 25 mg tablet                            Take 1 tablet by mouth 

2 times daily (with meals).                            Oral                        

                    No Longer Active                                                                   

                          2018                            [facility]                 

   

 

                          furosemide (LASIX) 20 mg tablet                            Take 1 tablet by mouth 

daily.                            Oral                            Active             

                                                                            2018      

                                        [facility]                    

 

                          traMADol (ULTRAM) 50 mg tablet                            Take 1 tablet by mouth every

 12 hours.                            Oral                            No Longer Active

                                                                                    2018

                                        [facility]                    

 

                          zolpidem (AMBIEN) 5 mg Tab                            Take 1 tablet by mouth nightly

 at bedtime as needed for Insomnia.                            Oral                

                    No Longer Active                                                           

                          2018                            [facility]         

           

 

                          Tramadol 50 Mg Tablet Ultram 50 Mg Tablet                            Take 1 tablet

 by mouth daily as needed for Pain.                            Oral                

                    No Longer Active                                                           

                          2018                            [facility]         

           

 

                          traMADol (ULTRAM) 50 mg tablet                            Take 1 tablet by mouth daily

 as needed for Pain.                            Oral                            No Longer

 Active                                                                              

                          2018                            [facility]                    

 

                                        Divalproex 250 Mg Tablet,Delayed Release Depakote 250 Mg Tablet,Delayed Release 

                                        Take 1 tablet by mouth 2 times daily.                   

                    Oral                            No Longer Active                              

                                                      2018                     

                                        [facility]                    

 

                          divalproex (DEPAKOTE) 250 mg delayed release tablet                            Take

 1 tablet by mouth 2 times daily.                            Oral                  

                    No Longer Active                                                             

                          2018                            [facility]           

         

 

                          NAPROXEN SODIUM (ALEVE OR)                            Take by mouth.              

                          Oral                            No Longer Active                       

                                                                            03/15/2018                

                                        [facility]                    

 

                          Gabapentin 300 Mg Capsule                            Take 300 mg by mouth 2 times 

daily.                            Oral                            No Longer Active   

                                                                                 03/15/2018

                                        [facility]                    

 

                                        Amoxicillin 875 Mg-Potassium Clavulanate 125 Mg Tablet Augmentin 875 Mg-125 Mg Tablet

                                        Take 1 tablet by mouth 2 times daily for 10 days.      

                          Oral                            No Longer Active               

                                                                            03/15/2018        

                                        [facility]                    

 

                                        Benzonatate 100 Mg Capsule Tessalon Perles 100 Mg Capsule                       

                                        Take 2 capsules by mouth 3 times daily as needed for up to 7 days for Cough.  

                          Oral                            No Longer Active           

                                                                            03/15/2018    

                                        [facility]                    

 

                          Levetiracetam 1,000 Mg Tablet                            Take 1 tablet by mouth 2 

times daily.                            Oral                            Active       

                                                                             03/15/2018

                                        [facility]                    

 

                          Valproic Acid 250 Mg Capsule                            NONFORMTake 2 capsules by 

mouth 2 times daily.                            Oral                            No Longer

 Active                                                                              

                          03/15/2018                            [facility]                    

 

                          Tramadol 50 Mg Tablet Ultram 50 Mg Tablet                            Take 1 tablet

 by mouth daily as needed for Pain.                            Oral                

                    No Longer Active                                                           

                          03/15/2018                            [facility]         

           

 

                          Famotidine 20 Mg Tablet Pepcid 20 Mg Tablet                            Take 1 tablet

 by mouth 2 times daily.                            Oral                            Active

                                                                                    03/15/2018

                                        [facility]                    

 

                          Carvedilol 6.25 Mg Tablet Coreg 6.25 Mg Tablet                            Take 1 tablet

 by mouth 2 times daily (with meals).                            Oral              

                    No Longer Active                                                         

                           03/15/2018                            [facility]       

             

 

                          Hydrochlorothiazide 25 Mg Tablet                            Take 1 tablet by mouth

 daily as needed for Other (fluid retention).                            Oral      

                      Active                                                           

                          03/15/2018                            [facility]         

           

 

                          Lisinopril 10 Mg Tablet Prinivil 10 Mg Tablet                            Take 1 tablet

 by mouth daily.                            Oral                            No Longer

 Active                                                                              

                          03/15/2018                            [facility]                    

 

                          gabapentin (NEURONTIN) 300 mg capsule                            Take 1 capsule by

 mouth 2 times daily.                            Oral                            No Longer

 Active                                                                              

                          03/15/2018                            [facility]                    

 

                          Levetiracetam (KEPPRA) 1,000 mg tablet                            Take 1 tablet by

 mouth 2 times daily.                            Oral                            No Longer

 Active                                                                              

                          03/15/2018                            [facility]                    

 

                          famotidine (PEPCID) 20 mg tablet                            Take 1 tablet by mouth

 2 times daily.                            Oral                            No Longer 

Active                                                                               

                          03/15/2018                            [facility]                    

 

                          carvedilol (COREG) 6.25 mg tablet                            Take 1 tablet by mouth

 2 times daily (with meals).                            Oral                       

                    No Longer Active                                                                  

                          03/15/2018                            [facility]                

    

 

                          hydroCHLOROthiazide (HYDRODIURIL) 25 mg tablet                            Take 1 tablet

 by mouth daily as needed for Other (fluid retention).                            Oral

                            No Longer Active                                         

                                                03/15/2018                            [facility]                    

 

                          lisinopril (PRINIVIL) 10 mg tablet                            Take 1 tablet by mouth

 daily.                            Oral                            No Longer Active  

                                                                                  03/15/2018

                                        [facility]                    

 

                          Acetaminophen 300 Mg-Codeine 30 Mg Tablet                            TAKE ONE (1) 

TABLET(S) BY MOUTH EVERY SIX HOURS AS NEEDED FOR DENTAL PAIN.                   
                                                No Longer Active                                   

                                                 2018                            

[facility]                    

 

                          Levetiracetam 1,000 Mg Tablet                            Take 1,000 mg by mouth 2 

times daily.                            Oral                            No Longer Active

                                                                                    2018

                                        [facility]                    

 

                          Valproic Acid 250 Mg Capsule                            Take 500 mg by mouth 2 times

 daily.                            Oral                            No Longer Active  

                                                                                  2018

                                        [facility]                    

 

                          Lorazepam 1 Mg Tablet                            Take 1 mg by mouth 2 times daily 

as needed.                            Oral                            Active         

                                                                            2017  

                                        [facility]                    

 

                          LORazepam (ATIVAN) 1 mg tablet                            Take 1 mg by mouth 2 times

 daily as needed.                            Oral                            Active  

                                                                                  2017

                                        [facility]                    

 

                          Tramadol 50 Mg Tablet Ultram 50 Mg Tablet                            Take 1 tablet

 by mouth daily as needed for Pain.                            Oral                

                    No Longer Active                                                           

                          2017                            [facility]         

           

 

                          Famotidine 20 Mg Tablet Pepcid 20 Mg Tablet                            Take 1 tablet

 by mouth 2 times daily.                            Oral                            No

 Longer Active                                                                       

                          2016                            [facility]                    

 

                          Tramadol 50 Mg Tablet Ultram 50 Mg Tablet                            Take 1 tablet

 by mouth every 8 hours as needed for Pain.                            Oral        

                          No Longer Active                                                 

                                                2016                            [facility] 

                   

 

                          Carvedilol 6.25 Mg Tablet Coreg 6.25 Mg Tablet                            Take 1 tablet

 by mouth 2 times daily (with meals).                            Oral              

                    No Longer Active                                                         

                           2016                            [facility]       

             

 

                          Hydrochlorothiazide 25 Mg Tablet                            Take 1 tablet by mouth

 daily as needed for Other (fluid retention).                            Oral      

                          No Longer Active                                               

                                                2016                            [facility]

                    

 

                          Mirtazapine 15 Mg Tablet Remeron 15 Mg Tablet                            Take 1 tablet

 by mouth at bedtime nightly.                            Oral                      

                    No Longer Active                                                                 

                          10/26/2016                            [facility]               

     

 

                          Lisinopril 10 Mg Tablet Prinivil 10 Mg Tablet                            Take 1 tablet

 by mouth daily.                            Oral                            No Longer

 Active                                                                              

                          10/26/2016                            [facility]                    

 

                                        Loperamide 2 Mg Capsule Anti-Diarrheal (Loperamide) 2 Mg Capsule                

                                        Take 1 capsule by mouth 4 times daily as needed for Diarrhea.          

                          Oral                            No Longer Active                   

                                                                            2016            

                                        [facility]                    

 

                                        Sucralfate 100 Mg/Ml Oral Suspension Carafate 100 Mg/Ml Oral Suspension         

                                        Take 10 mL by mouth 4 times daily (before meals and nightly).   

                          Oral                            No Longer Active            

                                                                            2016     

                                        [facility]                    

 

                          Ibuprofen 800 Mg Tablet                            Take 1 tablet by mouth every 8 

hours as needed for Pain.                            Oral                            

No Longer Active                                                                     

                          2016                            [facility]                   

 

 

                          Acetaminophen 500 Mg Tablet                            Take 1 tablet by mouth every

 6 hours as needed for Pain.                            Oral                       

                    No Longer Active                                                                  

                          2015                            [facility]                

    

 

                          Diclofenac Sodium 75 Mg Tablet,Delayed Release                            Take 1 tablet

 by mouth 2 times daily as needed for Pain.                            Oral        

                          No Longer Active                                                 

                                                2015                            [facility] 

                   

 

                          Sennosides 8.6 Mg Tablet Senokot 8.6 Mg Tablet                            Take 1 tablet

 by mouth daily.                            Oral                            No Longer

 Active                                                                              

                          2015                            [facility]                    

 

                          Docusate Sodium 100 Mg Tablet                            Take by mouth 2 times daily.

                            Oral                            No Longer Active         

                                                                            2015  

                                        [facility]                    



                                                                                
                                                                                
                                                                                
                                                                                
                                                                                
                                                                                
                                                                                
                                                                                
                                                                                
                                                                                
                                                                                
                                                                                
                                                                                
                                                                                
                                                                                
                                                                                
                                    



Allergies, Adverse Reactions, Alerts

                    





                    Substance                            Category                            Reaction   

                          Severity                            Reaction type           

                          Status                            Date Reported                     

                          Comments                            Source                    

 

                    Dicyclomine                                                        Swelling         

                                                      Propensity to adverse reactions to drug

                            Active                            2015             

                                                      [facility]                    

 

                    Caffeine                                                        Other               

                                                      Propensity to adverse reactions to drug     

                          Active                            2015                  

                                                      [facility]                    

 

                    Ketorolac Tromethamine                                                        Other 

                                                       Propensity to adverse reactions

 to drug                            Active                            2015     

                                                      [facility]                    

 

                    Codeine                                                        Itching, Other       

                          High, High                            Propensity to adverse reactions

 to drug                            Active                            2015     

                                                      [facility]                    

 

                    Perflutren                                                        Cough, Other      

                          Medium, Medium                            Propensity to adverse

 reactions to drug                            Active                            2016

                                                        [facility]                

    

 

                    Ketorolac                                                                           

                                                      Propensity to adverse reactions to drug         

                          Active                            2017                      

                                                      [facility]                    



                                                                                
               



Immunizations

                    





                    Immunization                            Date Given                            Site  

                          Status                            Last Updated             

                          Comments                            Source                    

 

                          Influenza, Vaccine<FLUCELVAX>(Multi-Dose)                            10/09/2018   

                                                      Not Given                       

                                                Deferred: Patient Refused                            [facility]                    

 

                    Influenza Vaccine                            10/26/2016                             

                           Not Given                                                 

                          Deferred: Patient Refused                            [facility]            

        

 

                    Influenza Vaccine                            2016                             

                           Not Given                                                 

                          Deferred: Patient Refused                            [facility]            

        

 

                    Influenza Vaccine                            2016                             

                           Not Given                                                 

                          Deferred: Patient Refused                            [facility]            

        

 

                          Azithromycin 250mg Tab In Clinic                            2016            

                                                completed                            Bre

                                                        [facility]                

    

 

                    Influenza Vaccine                            2015                             

                           Not Given                                                 

                          Deferred: Patient Refused                            [facility]            

        

 

                    Influenza Vaccine                            2015                             

                           Not Given                                                 

                          Deferred: Patient Refused                            [facility]            

        

 

                          Tdap Tetanus, diphtheria, acellular pertussis Vaccine                            2013

                                                        completed                    

                                                                            [facility]          

          



                                                                                
                                                                           



Results







                    Order Name                            Results                            Value      

                          Reference Range                            Date                

                          Interpretation                            Comments                       

                                        Source                    

 

                          HEMOGLOBIN A1C                            <td ID="[id number]">Hemoglobin

 A1c</td><td>5.3</td><td>4.3 - 6.1 %</td><td&gt;[facility]</td><td
ID="[id number]"/>    5.3                            4.3 - 6.1     

                          2019                                                    

                                                      [facility]                    

 

                    HEMOGLOBIN A1C                            Est Average Gluc    105.4                 

                                                2019                                      

                                                      [facility]                    

 

                    VALPROIC ACID                            Valproic Acid       <10.0                     

                    50 - 100                            2019                                  

                                        Test performed on [id number] using EMIT Immunoassay              

                                        [facility]                    

 

                    VALPROIC ACID                            Lab Interpretation    Abnormal             

                                                2019                                  

                                                      [facility]                    

 

                FREE T4                            Free T4         0.75                            0.61 - 1.18

                            2019                                               

                                                      [facility]                    

 

                          TSH                            <td ID="[id number]">TSH</td><td>1.03</td><td>0.57

 - 3.74 uIU/mL</td><td>[facility]</td><td 
ID="[id number]"/>    1.03                            0.57 - 3.74  

                          2019                                                 

                                                      [facility]                    

 

                          BASIC METABOLIC PANEL                            <td ID="[id number]">CO2</td><td>29</td><td>21

 - 31 mmol/L</td><td>[facility]</td><td 
ID="[id number]"/>    29                            21 - 31        

                    2019                                                         

                                        [facility]                    

 

                          BASIC METABOLIC PANEL                            <td ID="[id number]">Chloride</td><td>103</td><td>98

 - 107 mmol/L</td><td&gt;[facility]</td><td 
ID="[id number]"/>    103                            98 - 107      

                          2019                                                     

                                                      [facility]                    

 

                          BASIC METABOLIC PANEL                            <td ID="[id number]">Potassium</td><td>4.7</td><td>3.5

 - 5.1 mmol/L</td><td&gt;[facility]</td><td 
ID="[id number]"/>    4.7                            3.5 - 5.1     

                          2019                                                    

                                                      [facility]                    

 

                          BASIC METABOLIC PANEL                            <td ID="[id number]">Sodium</td><td>140</td><td>136

 - 145 mmol/L</td><td>[facility]</td><td 
ID="[id number]"/>    140                            136 - 145     

                          2019                                                    

                                                      [facility]                    

 

                          BASIC METABOLIC PANEL                            <td ID="[id number]">Glucose</td><td>92</td><td>70

 - 110 mg/dL</td><td>[facility]</td><td 
ID="[id number]"/>    92                            70 - 110       

                     2019                                                        

                                        [facility]                    

 

                          BASIC METABOLIC PANEL                            <td ID="[id number]">BUN</td><td>10</td><td>7

 - 25 mg/dL</td><td>[facility]</td><td 
ID="[id number]"/>    10                            7 - 25         

                    2019                                                          

                                        [facility]                    

 

                          BASIC METABOLIC PANEL                            <td ID="[id number]">Creatinine</td><td>1.00</td><td>0.7

 - 1.3 mg/dL</td><td&amp;gt;[facility]</td><td 
ID="[id number]"/>    1.00                            0.7 - 1.3    

                          2019                                                   

                                                      [facility]                    

 

                    BASIC METABOLIC PANEL                            Anion Gap           8                     

                                                2019                                          

                                                      [facility]                    

 

                          BASIC METABOLIC PANEL                            <td ID="[id number]">Calcium</td><td>10.2</td><td>8.6

 - 10.3 mg/dL</td><td&gt;[facility]</td><td 
ID="[id number]"/>    10.2                            8.6 - 10.3   

                          2019                                                  

                                                      [facility]                    

 

                    BASIC METABOLIC PANEL                            GFR, Estimated      >60              

                          mL/min/1.73 m2                            2019                   

                                                                            [facility]         

           

 

                    BASIC METABOLIC PANEL                            eGFR If Africn Am    >60           

                          mL/min/1.73 m2                            2019                

                                                                            [facility]      

              

 

                          LIVER PROFILE                            <td ID="[id number]">Protein,

 Total, Serum</td><td>7.1</td><td>6.0 - 8.3 g/dL</td>&lt;td>[facility] 
1</td><td ID="[id number]"/>    7.1                            6 - 

8.3                            2019                                            

                                                      [facility]                    

 

                          LIVER PROFILE                            <td ID="Bzvudb861466440Ebit9Audl">Albumin</td><td>4.4</td><td>4.2

 - 5.5 g/dL</td><td>[facility]</td><td 
ID="[id number]"/>    4.4                            4.2 - 5.5     

                          2019                                                    

                                                      [facility]                    

 

                          LIVER PROFILE                            <td ID="Tnstfc512545012Yrds2Nupp">Bilirubin,

 Total</td><td>0.3</td><td>0.2 - 1.2 mg/dL</td><td>[facility]</td><td 
ID="[id number]"/>    0.3                            0.2 - 1.2     

                          2019                                                    

                                                      [facility]                    

 

                          LIVER PROFILE                            <td ID="[id number]">Alkaline

 Phosphatase, S</td><td>64</td><td>34 - 104 U/L</td>&lt;td>[facility] 
1</td><td ID="[id number]"/>    64                            34 - 

104                            2019                                            

                                                      [facility]                    

 

                          LIVER PROFILE                            <td ID="[id number]">AST (SGOT)</td><td>20</td><td>13

 - 39 U/L</td><td>[facility]</td><td ID="[id number]"/>
                    20                            13 - 39                            2019         

                                                                            [facility]

                    

 

                          LIVER PROFILE                            <td ID="[id number]">ALT</td><td>23</td><td>7

 - 52 U/L</td><td>[facility]</td><td ID="[id number]"/>
                    23                            7 - 52                            2019          

                                                                            [facility]

                    

 

                          LIVER PROFILE                            <td ID="[id number]">D Bilirubin</td><td>0.1</td><td>0.0

 - 0.2 mg/dL</td><td&amp;gt;[facility]</td><td 
ID="[id number]"/>    0.1                            0 - 0.2       

                     2019                                                        

                                        [facility]                    

 

                          CBC/DIFF                            <td ID="[id number]">WBC</td><td>8.7</td><td>4.5

 - 12.0 K/uL</td><td>[facility]</td><td 
ID="[id number]"/>    8.7                            4.5 - 12      

                          2019                                                     

                                                      [facility]                    

 

                          CBC/DIFF                            <td ID="[id number]">RBC</td><td>5.97</td><td>4.60

 - 6.20 M/uL</td><td>[facility]</td><td 
ID="Kffwek566334448Ifgr3Jamlholhp"/>    5.97                            4.60 - 6.20  

                          2019                                                 

                                                      [facility]                    

 

                          CBC/DIFF                            <td ID="[id number]">Hemoglobin</td><td>17.2</td><td>14.0

 - 18.0 g/dL</td><td&amp;gt;[facility] 2</td><td 
ID="Cotwzz898683780Acig2Litlhzmom"/>    17.2                            14 - 18      

                          2019                                                     

                                                      [facility]                    

 

                          CBC/DIFF                            <td ID="[id number]">Hematocrit</td><td><span

 style="flagData">55.2</span><span style="flagData"> (H)</span></td><td>40.0 - 
54.0 %</td><td>[facility]</td><td ID="Ffjmkc051454092Kejy9Gfdfivtto"/>    55.2

                            40 - 54                            2019            

                                                                            [facility]  

                  

 

                          CBC/DIFF                            <td ID="[id number]">MCV</td><td><span

 style="flagData">93</span><span style="flagData"> (H)</span></td><td>82 - 92 
fL</td><td>[facility]</td><td ID=&quot;[id number]"/>    93

                            82 - 92                            2019            

                                                                            [facility]  

                  

 

                          CBC/DIFF                            <td ID="[id number]">MCH</td><td>28.8</td><td>27.0

 - 31.0 pg</td><td>[facility] 2</td><td 
ID="Vouzsx635781145Issl7Ctbnsgaox"/>    28.8                            27 - 31      

                          2019                                                     

                                                      [facility]                    

 

                          CBC/DIFF                            <td ID="Cikccx576364498Qgxi2Weit">MCHC</td><td><span

 style="flagData">31.2</span><span style="flagData"> (L)</span></td><td>32.0 - 
36.0 g/dL</td><td>[facility] 2</td><td ID="[id number]"/>
                    31.2                            32 - 36                            2019       

                                                                             [facility]

                    

 

                          CBC/DIFF                            <td ID="[id number]">RDW</td><td><span

 style="flagData">49.6</span><span style="flagData"> (H)</span></td><td>35.1 - 
43.9 fL</td><td>[facility] 2</td><td ID="Cvclqi571609215Sdfs3Oswxsjylu"/>    49.6

                            35.1 - 43.9                            2019        

                                                                            [facility]

                    

 

                          CBC/DIFF                            <td ID="[id number]">Platelets</td><td>336</td><td>150

 - 400 K/uL</td><td&gt;BT MA[facility]-STATION 2</td><td 
ID="[id number]"/>    336                            150 - 400     

                          2019                                                    

                                                      [facility]                    

 

                          CBC/DIFF                            <td ID="[id number]">Mean Platelet

 Volume</td><td>10.2</td><td>9.4 - 12.4 fL</td>&lt;td>[facility] 2</td><td 
ID="Gopmzg905085891Yqah97Xkbivibey"/>    10.2                            9.4 - 12.4  

                          2019                                                 

                                                      [facility]                    

 

                CBC/DIFF                            Percent NRBC    0.0                               

                          2019                                                  

                                                      [facility]                    

 

                CBC/DIFF                            Absolute NRBC    0.00                             

                           2019                                                

                                                      [facility]                    

 

                          CBC/DIFF                            <td ID="Xlxabs539873729Gyac55Txfi">Neutrophils</td><td>52.9</td><td>34.0

 - 67.9 %</td><td&amp;gt;[facility] MA[facility]-STATION 2</td><td 
ID="[id number]"/>    52.9                            34 - 67.9   

                          2019                                                  

                                                      [facility]                    

 

                          CBC/DIFF                            <td ID="[id number]">Lymphs</td><td>33.2</td><td>21.8

 - 50.0 %</td><td>[facility]-STATION 2</td><td 
ID="[id number]"/>    33.2                            21.8 - 50   

                          2019                                                  

                                                      [facility]                    

 

                          CBC/DIFF                            <td ID="[id number]">Monocytes</td><td>9.9</td><td>5.3

 - 12.0 %</td><td>[facility]ATION 2</td><td 
ID="[id number]"/>    9.9                            5.3 - 12     

                          2019                                                    

                                                      [facility]                    

 

                          CBC/DIFF                            <td ID="[id number]">Eos</td><td>2.8</td><td>0.8

 - 5.0 %</td><td>BT MAIN-STATION 2</td><td ID="[id number]"/>
                    2.8                            0.8 - 5                            2019        

                                                                            [facility]

                    

 

                          CBC/DIFF                            <td ID="[id number]">Basos</td><td>0.7</td><td>0.2

 - 1.2 %</td><td>BT MAIN-STATION 2</td><td ID="[id number]"/>
                    0.7                            0.2 - 1.2                            2019      

                                                                              [facility]

                    

 

                    CBC/DIFF                            Immature Granulocytes    0.5                    

                    0.0 - 0.5                            2019                                

                                                      [facility]                    



 

                    CBC/DIFF                            Neutrophils (Absolute)    4.59                  

                    1.78 - 5.36                            2019                            

                                                        [facility]                

    

 

                    CBC/DIFF                            Lymphs (Absolute)    2.88                       

                    1.32 - 3.57                            2019                                 

                                                      [facility]                    

 

                    CBC/DIFF                            Monocytes(Absolute)    0.86                     

                    0.3 - 0.82                            2019                                

                                                      [facility]                    



 

                CBC/DIFF                            Eos (Absolute)    0.24                            

0.04 - 0.54                            2019                                    

                                                      [facility]                    

 

                    CBC/DIFF                            Baso (Absolute)     0.06                         

                    0.01 - 0.08                            2019                                   

                                                      [facility]                    

 

                    CBC/DIFF                            Immature Grans (Abs)    0.04                    

                    0 - 0.03                            2019                                 

                                                      [facility]                    

 

                    CBC/DIFF                            Lab Interpretation    Abnormal                  

                                                2019                                       

                                                      [facility]                    

 

                          TRANSTHORACIC ECHO (TTE)                            TRANSTHORACIC ECHO (TTE) 

 Transthoracic 

 Echo Report 

 

 CARLITO ARIAS 

 

 Age:32 Gender: M 

 

 :1985 

 

 MRN:[id number] 

 

 Exam Date: 2018

                                        13:34 

 

 Exam Location: [facility] Echo

 

 

 Ordering Phys: ARLENE CATES 

 

 Referring Phys:ARLENE CATES 

 

 Reading Phys:Tuan Farias MD 

 

 Fellow Phys: Micky Espana M.D. 

 

 Fellow Phys: 

 

 Sonographer: Shama Samano 

 

 Reason For Exam: 

 

 Indications:worsening symptoms of SOB, NYHA

 III, tachycardia 

 

 ICD-9 Codes: 

 

 Exam Type: TRANSTHORACIC ECHO (TTE) 

 

 Procedure CPT:59514 

 

 Addtional CPT: 

 

 Ht (in): 69 BSA: 1.95HR: 104 

 

 Rhythm: Sinus rhythm 

 

 Wt (lb): 171BP: 137/ 93 

 

 Technical Quality: Good 

 

 History: 

 

 MEASUREMENTS(Male / Female) Normal Values 

 2D ECHO 

 Body Surface Area 2 m 

 LV Diastolic Diameter PLAX4.7 cm4.2 - 5.9 / 3.9 - 5.3 cm 

 LV Systolic Diameter PLAX 3.2 cm2.1 - 4.0 cm 

 LV Fractional Shortening PLAX 32.1 %25 - 46% 

 IVS Diastolic Thickness 1.1 cm 

 IVS Systolic Thickness1.5 cm 

 LVPW Diastolic Thickness1.1 cm 

 LVPW Systolic Thickness 1.4 cm 

 LV Relative Wall Thickness0.46 

 LVOT Diameter 2.1 cm 

 Aortic Root Diameter3.4 cm 

 LV Diastolic Volume MOD BP93.7 cm67 - 155 / 56 - 104 cm 

 LV Systolic Volume MOD BP 43.7 cm22 - 58 / 19 - 49 cm 

 LV Ejection Fraction MOD BP 53.4 %>=55% 

 LV Stroke Volume MOD BP 50 cm 

 LV Cardiac Output MOD CO6165 cm/min 

 LV Cardiac Index MOD BP 2662 cm/minm

 LA Volume 44.7 cm18 - 58 / 22 - 52 cm 

 LA Volume Index 22.9 cm/m 16 - 28 cm/m 

 

 DOPPLER 

 AV Peak Yemrxpqv881 cm/s 

 AV Peak Gradient4.7 mmHg 

 AV Mean Bslkfhxj67 cm/s

 AV Mean Gradient2.3 mmHg 

 AV Velocity Time Integral 17.8 cm

 LVOT Peak Xiqldurh53 cm/s

 LVOT Peak Gradient2.8 mmHg 

 LVOT Mean Rspegtxq47.9 cm/s

 LVOT Mean Gradient1.6 mmHg 

 LVOT Velocity Time Integral 14.7 cm

 LVOT Stroke Xvryrd06.5 cm 

 AV Area Cont Eq vti 3 cm

 AV Area Cont Eq pk2.7 cm

 Mitral E Point Velocity 62 cm/s

 Mitral A Point Velocity 54 cm/s

 Mitral E to A Ratio 1.1

 MV Deceleration Burleigh 285 cm/s

 MV Deceleration Stmc709 ms 

 PV Peak Hgtljxhr15.5 cm/s

 PV Peak Gradient3.5 mmHg 

 RVOT Peak Ddjpvyyn71.4 cm/s

 RVOT Peak Gradient1.9 mmHg 

 LV E' Lateral Velocity9.2 cm/s 

 Mitral E to LV E' Lateral Ratio 6.8

 LV E' Septal Velocity 7 cm/s 

 Mitral E to LV E' Septal Ratio8.8

 

 

 FINDINGS 

 

 Left Ventricle 

 The left ventricle is normal in size. Upper-normal LV wall thickness. LV

 systolic function is low normal. LVEF is 50-54%. There are no regional wall

 motion abnormalities.There is normal LV relaxation with normal filling

 pressures. 

 

 Right Ventricle 

 The right ventricle is normal in size and systolic function. TAPSE=1.8 cm. 

 

 Right Atrium 

 The right atrium is normal in size. 

 

 Left Atrium 

 The left atrium is normal in size. 

 IAS 

 

 

 Mitral Valve 

 Structurally normal mitral valve without significant stenosis or prolapse.

 There is no mitral regurgitation. 

 

 Aortic Valve 

 The aortic valve is trileaflet and structurally normal. There is no aortic

 stenosis. There is no aortic regurgitation. 

 

 Tricuspid Valve 

 Structurally normal tricuspid valve without significant stenosis. There is

 trace tricuspid regurgitation.Insufficient TR jet to estimate pulmonary

 artery systolic pressure. 

 

 Pulmonic Valve 

 Structurally normal pulmonic valve without significant stenosis. There is trace

 pulmonic regurgitation. 

 

 Pericardium 

 No pericardial effusion. 

 

 Aorta 

 Normal aortic root for body surface area. 

 

 IVC 

 The inferior vena cava is normal in size. 

 

 CONCLUSIONS 

 The left ventricle is normal in size. Upper-normal LV wall thickness.

 LV systolic function is low normal. LVEF is 50-54%.

 There are no regional wall motion abnormalities. 

 There is normal LV relaxation with normal filling pressures. 

 The right ventricle is normal in size and systolic function. 

 Atria are normal size. 

 No significant valvular abnormalities.

 Insufficient TR jet to estimate pulmonary artery systolic pressure.

 No pericardial effusion. 

 

 When compared to prior study from 2016, the LVEF is improved, no regional

 wall motion abnormalities are identified

 

 Tuan Farias MD 

 (Electronically Signed) 

 Final Date:2018

                                        17:11 

 

 2D ECHO 

 Body Surface Area 2 m 

 LV Diastolic Diameter PLAX4.7 cm4.2 - 5.9 / 3.9 - 5.3 cm 

 LV Systolic Diameter PLAX 3.2 cm2.1 - 4.0 cm 

 LV Fractional Shortening PLAX 32.1 %25 - 46% 

 IVS Diastolic Thickness 1.1 cm 

 IVS Systolic Thickness1.5 cm 

 LVPW Diastolic Thickness1.1 cm 

 LVPW Systolic Thickness 1.4 cm 

 LV Relative Wall Thickness0.46 

 LVOT Diameter 2.1 cm 

 Aortic Root Diameter3.4 cm 

 LV Diastolic Volume MOD BP93.7 cm67 - 155 / 56 - 104 cm 

 LV Systolic Volume MOD BP 43.7 cm22 - 58 / 19 - 49 cm 

 LV Ejection Fraction MOD BP 53.4 %>=55% 

 LV Stroke Volume MOD BP 50 cm 

 LV Cardiac Output MOD ZO0993 cm/min 

 LV Cardiac Index MOD BP 2662 cm/minm

 LA Volume 44.7 cm18 - 58 / 22 - 52 cm 

 LA Volume Index 22.9 cm/m 16 - 28 cm/m 

 

 DOPPLER 

 AV Peak Uxpywbey783 cm/s 

 AV Peak Gradient4.7 mmHg 

 AV Mean Urxtoyoz58 cm/s

 AV Mean Gradient2.3 mmHg 

 AV Velocity Time Integral 17.8 cm

 LVOT Peak Dyahrkve43 cm/s

 LVOT Peak Gradient2.8 mmHg 

 LVOT Mean Ucomlycf16.9 cm/s

 LVOT Mean Gradient1.6 mmHg 

 LVOT Velocity Time Integral 14.7 cm

 LVOT Stroke Rabgao04.5 cm 

 AV Area Cont Eq vti 3 cm

 AV Area Cont Eq pk2.7 cm

 Mitral E Point Velocity 62 cm/s

 Mitral A Point Velocity 54 cm/s

 Mitral E to A Ratio 1.1

 MV Deceleration Burleigh 285 cm/s

 MV Deceleration Jklj895 ms 

 PV Peak Obkulxhm93.5 cm/s

 PV Peak Gradient3.5 mmHg 

 RVOT Peak Hwychtxp60.4 cm/s

 RVOT Peak Gradient1.9 mmHg 

 LV E' Lateral Velocity9.2 cm/s 

 Mitral E to LV E' Lateral Ratio 6.8

 LV E' Septal Velocity 7 cm/s 

 Mitral E to LV E' Septal Ratio8.8                                                 

                    2018                                                                      

                                        [facility]                    

 

                TSH                            TSH             0.41                            0.57 - 3.74     

                          07/10/2018                                                    

                                                      [facility]                    

 

                    TSH                            Lab Interpretation    Abnormal                       

                                                07/10/2018                                            

                                                      [facility]                    

 

                    HEMOGLOBIN A1C                            Hemoglobin A1c      5.5                     

                    4.3 - 6.1                            07/10/2018                                 

                                                      [facility]                    

 

                    HEMOGLOBIN A1C                            Est Average Gluc    111.2                 

                                                07/10/2018                                      

                                                      [facility]                    

 

                BASIC METABOLIC PANEL                            CO2             26                            

21 - 31                            07/10/2018                                        

                                                      [facility]                    

 

                    BASIC METABOLIC PANEL                            Chloride            103                    

                    98 - 107                            07/10/2018                                 

                                                      [facility]                    

 

                    BASIC METABOLIC PANEL                            Potassium           4.8                   

                    3.5 - 5.1                            07/10/2018                               

                                                      [facility]                   

 

 

                    BASIC METABOLIC PANEL                            Sodium              137                      

                    136 - 145                            07/10/2018                                  

                                                      [facility]                    

 

                    BASIC METABOLIC PANEL                            Glucose             110                     

                    70 - 110                            07/10/2018                                  

                                                      [facility]                    

 

                    BASIC METABOLIC PANEL                            Urea Nitrogen       11                

                    7 - 25                            07/10/2018                               

                                                      [facility]                   

 

 

                    BASIC METABOLIC PANEL                            Creatinine          0.90                 

                    0.7 - 1.3                            07/10/2018                             

                                                       [facility]                 

   

 

                    BASIC METABOLIC PANEL                            Anion Gap           8                     

                                                07/10/2018                                          

                                                      [facility]                    

 

                    BASIC METABOLIC PANEL                            Calcium             10.3                    

                    8.6 - 10.3                            07/10/2018                               

                                                      [facility]                   

 

 

                    BASIC METABOLIC PANEL                            GFR, Estimated      >60              

                          mL/min/1.73 m2                            07/10/2018                   

                                                                            [facility]         

           

 

                    BASIC METABOLIC PANEL                            GFR, Estim, Afr-Am    >60          

                          mL/min/1.73 m2                            07/10/2018               

                                                                            [facility]     

               

 

                          LIPID PROFILE                            <td ID="[id number]">Cholesterol</td><td><span>184</span><br/><span

 style=&quot;allIndent"><span style="cellHeader">Comment: </span><br/><span 
ID="[id number]" style="pre">REFERENCE RANGE:<br/>Desirable: 
<200 mg/dL<br/>Borderline: 200-240 mg/dL<br/>High Risk: >240 mg/dL<br/&amp;gt; 
<br/></span></span></td><td>mg/dL</td><td>BT MAIN-STATION 1</td><td ID=&amp
;quot;[id number]"/>    184                                        

                    07/10/2018                                                        REFERENCE

 RANGE:<br/>Desirable: <200 mg/dL<br/>Borderline: 200-240 mg/dL<br/>High Risk: 
>240 mg/dL<br/> <br/>                            [facility]                    

 

                          LIPID PROFILE                            <td ID="[id number]">Triglyceride</td><td><span>70</span><br/><span

 style=&quot;allIndent"><span style="cellHeader">Comment: </span><br/><span 
ID="[id number]" style="pre">REFERENCE RANGE:<br/>Normal: <150 
mg/dL<br/>Borderline High: 150-199 mg/dL<br/>High: 200-499 mg/dL<br/>Very High: 
>tw=775 mg/dL<br/> <br/></span></span></td><td><150 mg/dL</td><td&gt;BT MAIN-
STATION 1</td><td ID="[id number]"/>    70                       

                    <150                            07/10/2018                                        

                                        REFERENCE RANGE:<br/>Normal: <150 mg/dL<br/>Borderline High: 150-199

 mg/dL<br/>High: 200-499 mg/dL<br/>Very High: >no=287 mg/dL<br/> <br/>          
                                        [facility]                    

 

                          LIPID PROFILE                            <td ID="[id number]">HDL</td><td><span>51</span><br/><span

 style="allIndent"><span style="cellHeader">Comment: </span><br/><span 
ID="[id number]" style=&amp;quot;pre">Increased CHD risk: <40 
mg/dL<br/>Decreased CHD risk: >60 mg/dL<br/> <br/></span></span>&am
p;lt;/td><td>mg/dL</td><td>BT MAIN-STATION 1</td><td 
ID="[id number]"/>    51                                           

                    07/10/2018                                                        Increased

 CHD risk: <40 mg/dL<br/>Decreased CHD risk: >60 mg/dL<br/> <br/>               
                                        [facility]                    

 

                          LIPID PROFILE                            <td ID="[id number]">LDL</td><td><span>119</span><br/><span

 style="allIndent"><span style="cellHeader">Comment: </span><br/><span 
ID="[id number]" style=&amp;quot;pre">REFERENCE 
RANGE:<br/>Optimal: <100 mg/dL<br/>Near Optimal: 100-129 mg/dL<br/>Borderline 
High: 130-159 mg/dL<br/&gt;High: 160-189 mg/dL<br/>Very High: >ce=087 mg/dL<br/>
<br/></span></span></td><td>mg/dL</td><td>BT MAIN-STATION 1</td><td 
ID="[id number]"/>    119                                          

                    07/10/2018                                                        REFERENCE

 RANGE:<br/>Optimal: <100 mg/dL<br/>Near Optimal: 100-129 mg/dL<br/>Borderline 
High: 130-159 mg/dL<br/>High: 160-189 mg/dL<br/>Very High: >og=651 mg/dL<br/> 
<br/>                                   [facility]                    

 

                LIVER PROFILE                            T Protein       7.2                            6

 - 8.3                            07/10/2018                                         

                                                      [facility]                    

 

                LIVER PROFILE                            Albumin         4.4                            4.2

 - 5.5                            07/10/2018                                         

                                                      [facility]                    

 

                    LIVER PROFILE                            T Bilirubin         0.4                         

                    0.2 - 1.2                            07/10/2018                                     

                                                      [facility]                    

 

                LIVER PROFILE                            Alk Phos        69                            34 

- 104                            07/10/2018                                          

                                                      [facility]                    

 

                LIVER PROFILE                            AST             17                            13 - 39 

                           07/10/2018                                                

                                                      [facility]                    

 

                LIVER PROFILE                            ALT             22                            7 - 52  

                          07/10/2018                                                 

                                                      [facility]                    

 

                    LIVER PROFILE                            D Bilirubin         0.1                         

                    0 - 0.2                            07/10/2018                                       

                                                      [facility]                    

 

                CBC/DIFF                            WBC             12.1                            4.5 - 12   

                          07/10/2018                                                  

                                                      [facility]                    

 

                CBC/DIFF                            RBC             5.85                            4.60 - 6.20

                            07/10/2018                                               

                                                      [facility]                    

 

                CBC/DIFF                            Hemoglobin      17.5                            14 -

 18                            07/10/2018                                            

                                                      [facility]                    

 

                CBC/DIFF                            Hematocrit      52.5                            40 -

 54                            07/10/2018                                            

                                                      [facility]                    

 

                CBC/DIFF                            MCV             90                            82 - 92      

                          07/10/2018                                                     

                                                      [facility]                    

 

                CBC/DIFF                            MCH             29.9                            27 - 31    

                          07/10/2018                                                   

                                                      [facility]                    

 

                CBC/DIFF                            MCHC            33.3                            32 - 36   

                          07/10/2018                                                  

                                                      [facility]                    

 

                CBC/DIFF                            RDW             44.4                            35.1 - 43.9

                            07/10/2018                                               

                                                      [facility]                    

 

                CBC/DIFF                            Platelet        386                            150 - 400

                            07/10/2018                                               

                                                      [facility]                    

 

                    CBC/DIFF                            Mean Platelet Volume    10.0                    

                    9.4 - 12.4                            07/10/2018                               

                                                      [facility]                   

 

 

                CBC/DIFF                            Percent NRBC    0.0                               

                          07/10/2018                                                  

                                                      [facility]                    

 

                CBC/DIFF                            Absolute NRBC    0.00                             

                           07/10/2018                                                

                                                      [facility]                    

 

                CBC/DIFF                            Neutrophil      75.5                            34 -

 67.9                            07/10/2018                                          

                                                      [facility]                    

 

                CBC/DIFF                            Lymphocyte      16.1                            21.8

 - 50                            07/10/2018                                          

                                                      [facility]                    

 

                CBC/DIFF                            Monocyte        6.3                            5.3 - 12

                            07/10/2018                                               

                                                      [facility]                    

 

                CBC/DIFF                            Eosinophil      1.2                            0.8 -

 5                            07/10/2018                                             

                                                      [facility]                    

 

                CBC/DIFF                            Basophil        0.5                            0.2 - 1.2

                            07/10/2018                                               

                                                      [facility]                    

 

                CBC/DIFF                            Pct Immat Gran    0.4                            0.0

 - 0.5                            07/10/2018                                         

                                                      [facility]                    

 

                    CBC/DIFF                            Neutrophil, Abs     9.14                         

                    1.78 - 5.36                            07/10/2018                                   

                                                      [facility]                    

 

                    CBC/DIFF                            Lymphocyte, Abs     1.95                         

                    1.32 - 3.57                            07/10/2018                                   

                                                      [facility]                    

 

                CBC/DIFF                            Monocyte, Abs    0.76                            0.3

 - 0.82                            07/10/2018                                        

                                                      [facility]                    

 

                    CBC/DIFF                            Eosinophil, Abs     0.15                         

                    0.04 - 0.54                            07/10/2018                                   

                                                      [facility]                    

 

                CBC/DIFF                            Basophil, Abs    0.06                            0.01

 - 0.08                            07/10/2018                                        

                                                      [facility]                    

 

                    CBC/DIFF                            Absol Immat Gran    0.05                        

                    0 - 0.03                            07/10/2018                                     

                                                      [facility]                    

 

                    CBC/DIFF                            Lab Interpretation    Abnormal                  

                                                07/10/2018                                       

                                                      [facility]                    

 

                    12 LEAD EKG                            12 LEAD EKG FOR [facility]



 

 Test Date:2018

Pat Name: CARLITO ARIAS Department: 

Patient ID: [id number]: 

Gender: MTechnician: [id number]

:1985 Requested By: 

Order Number:Reading MD: Lucas Santiago M.D.

 Measurements

IntervalsAxis

Rate: 92 P:65

AK: 128QRS:78

QRSD: 74 T:49

QT: 334

QTc:413

 Interpretive Statements

Normal sinus rhythm

Normal ECG

 

Electronically Signed On 18 17:05:02 CDT by Lucas Santiago M.D.

                                                        2018                   

                                                                            [facility]         

           



                                                                                
                                                                                
                                                                                
                                                                                
                                                                                
                                                                                
                                                                                
                                                                                
                                                                                
                                                                                
                                                                                
                                       



Pathology Reports







                                        No Data Provided for This Section                    



                                                



Diagnostic Reports

                    





                    Report                            Value                            Date             

                                        Source                    

 

                          XRAY CHEST 2 VIEWS                            IMPRESSION: 1.Age-indeterminate wedge-

shaped compression deformities of the T7 andT8 vertebral bodies, new since 
2016.2.Lungs are clear.  Dictated By: Darin Denny DO, 2019 1:23 
PM I have reviewed the study and agree with the findings in this report. Signed 
By: Crystal De La Cruz MD, 2019 1:56 PM EXAM: XRAY CHEST 2 VIEWS DATE:
2019 10:06 AM INDICATION: Sob on exertion, h/o CHF COMPARISON: Chest x-ray 
2/3/2016 FINDINGS: Devices, Lines, and Tubes: None. Heart and Mediastinum: No 
abnormalities. Lungs and Pleura: Clear. Bones and Soft Tissues: Age-
indeterminate wedge-shaped compressiondeformities of the T7 and T8 vertebral 
bodies with with 40% height loss,and increased thoracic kyphosis. Cortical 
anchor screws in the righthumeral head.  Interface, Rad/Mammog In - 2019  
2:01 PM CSTEXAM: XRAY CHEST 2 VIEWS

DATE: 2019 10:06 AM

INDICATION: Sob on exertion, h/o CHF

COMPARISON: Chest x-ray 2/3/2016

FINDINGS: 

Devices, Lines, and Tubes: None.

Heart and Mediastinum: No abnormalities.

Lungs and Pleura: Clear.

Bones and Soft Tissues: Age-indeterminate wedge-shaped compression

deformities of the T7 and T8 vertebral bodies with with 40% height loss,

and increased thoracic kyphosis. Cortical anchor screws in the right

humeral head. 

IMPRESSION

IMPRESSION: 

                                        1.  Age-indeterminate wedge-shaped compression deformities of the T7 and

T8 vertebral bodies, new since 2016.

                                        2.  Lungs are clear. 

Dictated By: Darin Denny DO, 2019 1:23 PM

I have reviewed the study and agree with the findings in this report.

Signed By: Crystal De La Cruz MD, 2019 1:56 PM

                            2019                            [facility]      

              



                                                                    



Consultation Notes

                    





                                        No Data Provided for This Section                    



                                                            



Discharge Summaries

                    





                                        No Data Provided for This Section                    



                                                            



History and Physicals

                    





                                        No Data Provided for This Section                    



                                                                



Vital Signs

                     





                    Vital Sign                            Value                            Date         

                          Comments                            Source                    

 

                    Systolic (mm Hg)                            117                             2019

                                                        [facility]                

    

 

                    Diastolic (mm Hg)                            76                             2019

                                                        [facility]                

    

 

                    Heart Rate                            109                             2019    

                                                      [facility]                    



 

                          Temperature Oral (F)                            36.83 Any                         

                    2019                                                        [facility]     

               

 

                    Respitory Rate                            18                             2019 

                                                       [facility]                 

   

 

                    Height                            175.3 cm                            2019    

                                                      [facility]                    



 

                    Weight                            81.92                             2019      

                                                      [facility]                    

 

                    Systolic (mm Hg)                            108                             10/09/2018

                                                        [facility]                

    

 

                    Diastolic (mm Hg)                            71                             10/09/2018

                                                        [facility]                

    

 

                    Heart Rate                            85                             10/09/2018     

                                                      [facility]                    

 

                          Temperature Oral (F)                            36.61 Any                         

                    10/09/2018                                                        [facility]     

               

 

                    Respitory Rate                            18                             10/09/2018 

                                                       [facility]                 

   

 

                    Height                            175.3 cm                            10/09/2018    

                                                      [facility]                    



 

                    Weight                            82.01                             10/09/2018      

                                                      [facility]                    

 

                    BMI Calculated                            26.70                             10/09/2018

                                                        [facility]                

    

 

                    Systolic (mm Hg)                            124                             2018

                                                        [facility]                

    

 

                    Diastolic (mm Hg)                            81                             2018

                                                        [facility]                

    

 

                    Heart Rate                            121                             2018    

                                                      [facility]                    



 

                          Temperature Oral (F)                            36.89 Any                         

                    2018                                                        [facility]     

               

 

                    Respitory Rate                            18                             2018 

                                                       [facility]                 

   

 

                    Height                            175.3 cm                            2018    

                                                      [facility]                    



 

                    Weight                            77.928                             2018     

                                                      [facility]                    

 

                    BMI Calculated                            25.37                             2018

                                                        [facility]                

    



                                                                                
                                                                                
                                                                                
                                                                                
                                                                                
                                   



Encounters

                    





                    Location                            Location Details                            Encounter

 Type                            Encounter Number                            Reason For

 Visit                            Attending Provider                            ADM Date

                            DC Date                            Status                

                                        Source                    

 

                    [facility] [place]                                                        Refill

                            [id number]                            RLQ abdominal pain Right

 upper quadrant abdominal pain                            Arlene Cates MD           

                    2018                                                            

                                        [facility] [place]                                                        Refill

                            [id number]                            Heart palpitations Chronic

 intractable headache, unspecified headache type Combined systolic and diastolic
congestive heart failure, unspecified congestive heart failure chronicity Sinus 
tachycardia Right upper quadrant abdominal pain RLQ abdominal pain              
                          Arlene Cates MD                            2018                    

                                                                            [facility]          

          

 

                    Pharmacy [place]                                                        Pharmacy 

Visit                            [phone]                                           

                                                  2018                                  

                                                      [facility]                    

 

                    Pharmacy [place]                                                        Pharmacy 

Visit                            [phone]                                           

                                                  2018                                  

                                                      [facility]                    

 

                    Pharmacy [place]                                                        Pharmacy 

Visit                            [phone]                                           

                                                  03/15/2018                                  

                                                      [facility] [place]                                                        Office

 Visit                            [phone]                            [facility]

 care Acute non-recurrent maxillary sinusitis Cough Combined systolic and 
diastolic congestive heart failure, unspecified congestive heart failure 
chronicity Sinus tachycardia Seizures Gastroesophageal reflux disease without 
esophagitis Pain in thoracic spine at multiple sites                            Arlene Cates MD                            03/15/2018                            03/15/2018 

                                                       [facility]                 

   

 

                    Pharmacy [place]                                                        Pharmacy 

Visit                            [phone]                                           

                                                  2018                                  

                                                      [facility] [place]                                                        Orders

 Only                            [id number]                            Seizure        

                          Angelica Thompson [facility]                            2018         

                                                                            [facility]

                    

 

                    Pharmacy [place]                                                        Pharmacy 

Visit                            [phone]                                           

                                                  2018                                  

                                                      [facility]                    

 

                    Pharmacy [facility]                                                        Pharmacy Visit

                            [id number]                                                

                                                  2018                                       

                                                      [facility]                    

 

                    Pharmacy [place]                                                        Pharmacy 

Visit                            [phone]                                           

                                                  2018                                  

                                                      [facility] [place]                                                        Refill

                            [id number]                            Acute non-recurrent 

maxillary sinusitis Cough                            Dylan Tolentino MD              

                    2018                                                               

                                        [facility]                                        

                          Pharmacy Visit                            [id number]                  

                                                                              2018         

                                                                            [facility]

                    

 

                    Pharmacy [place]                                                        Pharmacy 

Visit                            [id number]                                           

                                                  2018                                  

                                                      [facility]                                        

                          Pharmacy Visit                            [id number]                  

                                                                              2018         

                                                                            [facility]

                    

 

                    Pharmacy [place]                                                        Pharmacy 

Visit                            [phone]                                           

                                                  2018                                  

                                                      [facility]                                        

                          Pharmacy Visit                            [id number]                  

                                                                              2018         

                                                                            [facility]

                    

 

                    Pharmacy [place]                                                        Pharmacy 

Visit                            [id number]                                           

                                                  04/10/2018                                  

                                                      [facility]                                        

                          Pharmacy Visit                            [id number]                  

                                                                              04/10/2018         

                                                                            [facility]

                    

 

                    Pharmacy [place]                                                        Pharmacy 

Visit                            [id number]                                           

                                                  2018                                  

                                                      [facility]                    

 

                    Pharmacy [place]                                                        Pharmacy 

Visit                            [id number]                                           

                                                  2018                                  

                                                      [facility] [place]                                                        Refill

                            [id number]                            Pain in thoracic spine

 at multiple sites                            Dylan Tolentino MD                     

                    2018                                                                      

                                        [facility]                                        

                          Pharmacy Visit                            [id number]                  

                                                                              2018         

                                                                            [facility] [place]                                                        Refill

                            [id number]                            Pain in thoracic spine

 at multiple sites                            Dylan Tolentino MD                     

                    2018                                                                      

                                        [facility]                    

 

                    Pharmacy [place]                                                        Pharmacy 

Visit                            [phone]                                           

                                                  2018                                  

                                                      [facility]                    

 

                    Pharmacy [place]                                                        Pharmacy 

Visit                            [phone]                                           

                                                  2018                                  

                                                      [facility]                    

 

                    Pharmacy [place]                                                        Pharmacy 

Visit                            [phone]                                           

                                                  2018                                  

                                                      [facility]                    

 

                    Pharmacy [place]                                                        Pharmacy 

Visit                            [phone]                                           

                                                  2018                                  

                                                      [facility]                                        

                          Pharmacy Visit                            [phone]                  

                                                                              2018         

                                                                            [facility]

                    

 

                    Pharmacy [place]                                                        Pharmacy 

Visit                            [phone]                                           

                                                  2018                                  

                                                      [facility]                    

 

                    Pharmacy [place]                                                        Pharmacy 

Visit                            [phone]                                           

                                                  2018                                  

                                                      [facility] [place]                                                        Office

 Visit                            [phone]                                          

                          Arlene Cates MD                            2018                                                        [facility]

                    

 

                    Laboratory [place]                                                        Lab Appointment

                            [id number]                                                

                          Arlene Cates MD                            2018                                                        [facility]     

               

 

                    Pharmacy [place]                                                        Pharmacy 

Visit                            [phone]                                           

                                                  2018                                  

                                                      [facility] [place]                                                        Refill

                            [id number]                                                

                          Dylan Tolentino MD                            2018                       

                                                                            [facility]             

       

 

                    Pharmacy [place]                                                        Pharmacy 

Visit                            [phone]                                           

                                                  2018                                  

                                                      [facility]                                        

                          Pharmacy Visit                            [phone]                  

                                                                              2018         

                                                                            [facility]

                    

 

                    Pharmacy [place]                                                        Pharmacy 

Visit                            [phone]                                           

                                                  2018                                  

                                                      [facility]                                                        Hospital

 Encounter                            [id number]                                      

                          Arlene Cates MD                            2018                                                        [facility] [place]                                                        Refill

                            [id number]                                                

                          Luz Mitchell RN                            2018                     

                                                                            [facility]           

         

 

                    Pharmacy [place]                                                        Pharmacy 

Visit                            [phone]                                           

                                                  2018                                  

                                                      [facility]                    

 

                    Pharmacy [place]                                                        Pharmacy 

Visit                            [phone]                                           

                                                  2018                                  

                                                      [facility]                    

 

                    Pharmacy [place]                                                        Pharmacy 

Visit                            [phone]                                           

                                                  2018                                  

                                                      [facility]                                        

                          Pharmacy Visit                            [phone]                  

                                                                              10/02/2018         

                                                                            [facility]

                    

 

                    Pharmacy [place]                                                        Pharmacy 

Visit                            [phone]                                           

                                                  10/02/2018                                  

                                                      [facility]                    

 

                    Pharmacy [place]                                                        Pharmacy 

Visit                            [phone]                                           

                                                  10/04/2018                                  

                                                      [facility]                    

 

                    Pharmacy [place]                                                        Pharmacy 

Visit                            [phone]                                           

                                                  10/09/2018                                  

                                                      [facility] [place]                                                        Office

 Visit                            [phone]                                          

                          Arlene Cates MD                            10/09/2018                   

                    10/09/2018                                                        [facility]

                    

 

                    Pharmacy [place]                                                        Pharmacy 

Visit                            [phone]                                           

                                                  10/10/2018                                  

                                                      [facility]                    

 

                    Pharmacy [place]                                                        Pharmacy 

Visit                            [phone]                                           

                                                  2018                                  

                                                      [facility] [place]                                                        Refill

                            [id number]                                                

                          Luz Mitchell RN                            2018                     

                                                                            [facility]           

         

 

                    Family Practice [place]                                                        Refill

                            [id number]                                                

                          Arlene Cates MD                            2018                         

                                                                            [facility]               

     

 

                                                                            Travel                      

                    [phone]                                                                        

                    2019                                                               

                                        [facility]                                                        Office Visit 

                           [phone]                                                 

                    Arlene Cates MD                            2019                                                        [facility] [place]                                                        Orders

 Only                            [id number]                                           

                          Arlene Cates MD                            2019                    

                                                                            [facility] [place]                                                        Office

 Visit                            [phone]                                          

                          Arlene Cates MD                            2019                                                        [facility]

                    

 

                    Laboratory [place]                                                        Lab Appointment

                            [id number]                                                

                          Arlene Cates MD                            2019                                                        [facility]     

               

 

                    Radiology [place]                                                        Ancillary

 Procedure                            [id number]                                      

                          Arlene Ctaes MD                            2019                                                        [facility] [place]                                                        Refill

                            [id number]                                                

                          Arlene Cates MD                            2019                         

                                                                            [facility] [place]                                                        Refill

                            [id number]                                                

                          Dylan Tolentino MD                            2019                       

                                                                            [facility] [place]                                                        Refill

                            [id number]                                                

                          Arlene Cates MD                            2019                         

                                                                            [facility] [place]                                                        Telephone

                            [phone]                                                

                          Ahmet Mcnamara                             2019                       

                                                                            [facility]             

       

 

                                                                            Travel                      

                    [phone]                                                                        

                    2019                                                               

                                        [facility] [place]                                                        Orders

 Only                            [id number]                                           

                          Arlene Cates MD                            2019                    

                                                                            [facility] [place]                                                        Office

 Visit                            [phone]                                          

                          Arlene Cates MD                            2019                                                        [facility] [place]                                                        Refill

                            [id number]                                                

                          Arlene Cates MD                            2019                         

                                                                            [facility]               

     

 

                    EKG (5400) BT                                                        Hospital Encounter

                            [id number]                                                

                          Arlene Cates MD                            2019                         

                                                                            [facility]               

     



                                                                                
                                                                                
                                                                                
                                                                                
                                                                                
                                                                                
                                                                                
                                                                                
                                                                                
                                                                                
                                                      



Procedures

                    





                    Procedure                            Code                            Date           

                          Perfomer                            Comments                        

                                        Source                    

 

                    XRAY CHEST 2 VIEWS                            89652                            2019

                            [facility]                    

 

                    VALPROIC ACID                            [id number]                            2019

                            [facility]                    

 

                    LIVER PROFILE                            63516                            2019

                            [facility]                    

 

                    BASIC METABOLIC PANEL                            11972                            2019

                            [facility]                    

 

                    CBC/DIFF                            63023                            2019     

                       [facility]                    

 

                    HEMOGLOBIN A1C                            65572                            2019

                            [facility]                    

 

                    FREE T4                            03283                            2019      

                      [facility]                    

 

                          THYROID STIMULATING HORMONE (TSH)                            46228                

                    2019                            [facility]                    

 

                    PULMONARY FUNCTION TEST                            [id number]                            

02/15/2019                            [facility]                    

 

                          TRANSTHORACIC ECHO (TTE)                            31095                         

                    2018                            [facility]                    

 

                          TRANSTHORACIC ECHO (TTE)                            56951                         

                    2018                            [facility]                    

 

                    LIPID PROFILE                            83691                            2018

                            [facility]                    

 

                    LIVER PROFILE                            00951                            2018

                            [facility]                    

 

                    BASIC METABOLIC PANEL                            15238                            2018

                            [facility]                    

 

                    CBC/DIFF                            03079                            2018     

                       [facility]                    

 

                    HEMOGLOBIN A1C                            35581                            2018

                            [facility]                    

 

                          THYROID STIMULATING HORMONE (TSH)                            63213                

                    2018                            [facility]                    

 

                    LIPID PROFILE                            30559                            2018

                            [facility]                    

 

                    LIVER PROFILE                            60140                            2018

                            [facility]                    

 

                    BASIC METABOLIC PANEL                            63539                            2018

                            [facility]                    

 

                    CBC/DIFF                            27630                            2018     

                       [facility]                    

 

                    HEMOGLOBIN A1C                            22019                            2018

                            [facility]                    

 

                    TSH                            66661                            2018          

                    [facility]

                    

 

                    12 LEAD EKG                            07831                            2018  

                          [facility]                    

 

                    12 LEAD EKG                            53912                            2018  

                          [facility]                    



                                                                                
                                                                                
                                                                                
                                                                                
                                                                                
                                       



Assessment and Plan

                    





                                        No Data Provided for This Section                    



                                    



Plan of Care







                    Plan of Care        Date                Source

 

                    Not on file                            2019                            [facility]                    

 

                                        Upcoming EncountersDateTypeSpecialtyCare TeamDescription

                                        01/10/2019

Office Visit

Neurology

ref #: [id number]

                            2018                            [facility]      

              

 

                                        Upcoming EncountersDateTypeSpecialtyCare TeamDescription

                                        10/09/2018

Office Visit

Family Practice

Arlene Cates  [address] 
[phone] (Fax)

[facility]

                            10/04/2018                            [facility]      

              



                                                                                
                       



Social History

                    





                    Social History                            Date                            Source    

                

 

                                        Tobacco UseTypesPacks/DayYears UsedDate

Light Tobacco Smoker

Cigarettes

                                        0.5

                                        10



Smokeless Tobacco: Never Used



Tobacco Cessation: Ready to Quit: Yes; Counseling Given: Yes

Comments: trying to quit

Alcohol UseDrinks/Weekoz/WeekComments

No

                                        0 Standard drinks or equivalent

                                        0.0

none ETOH for four years (2016)

Food InsecurityAnswerDate Recorded

Within the past 12 months, you worried that your food would run out before you 
got money to buy more.

Sometimes true

                                        2019

Within the past 12 months, the food you bought just didn't last and you didn't 
have money to get more.

Sometimes true

                                        2019

Sex Assigned at BirthDate Recorded

Not on file



Job Start DateOccupationIndustry

Not on file

Travel HistoryTravel StartTravel End

No recent travel history available.

                            2019                            [facility]      

              



                                                                        



Family History

                    





                    Value                            Date                            Source             

       

 

                                        Medical HistoryRelationNameComments

Hypertension

Mother



Other

Mother

migraine, depression

RelationNameStatusComments

Brother

Alive



Father

Alive



Maternal Grandfather





Maternal Grandmother





Mother

Alive



Paternal Grandfather





Paternal Grandmother

Alive



Sister

Alive



Sister

Alive



Sister

Alive



Son

Alive



                            2019                            [facility]      

              

 

                                        Medical HistoryRelationNameComments

Hypertension

Mother



Other

Mother

migraine, depression

RelationNameStatusComments

Brother

Alive



Father

Alive



Maternal Grandfather





Maternal Grandmother





Mother

Alive



Paternal Grandfather





Paternal Grandmother

Alive



Sister

Alive



Sister

Alive



Sister

Alive



Son

Alive



                            2018                            [facility]      

              

 

                                        Medical HistoryRelationNameComments

Hypertension

Mother



Other

Mother

migraine, depression

RelationNameStatusComments

Brother

Alive



Father

Alive



Maternal Grandfather





Maternal Grandmother





Mother

Alive



Paternal Grandfather





Paternal Grandmother

Alive



Sister

Alive



Sister

Alive



Sister

Alive



Son

Alive



                            10/04/2018                            [facility]      

              



                                                                                
                   



Advance Directives

                    





                                        No Data Provided for This Section                    



                                                            



Functional Status

                    





                                        No Data Provided for This Section

## 2019-07-23 ENCOUNTER — HOSPITAL ENCOUNTER (EMERGENCY)
Dept: HOSPITAL 88 - FSED | Age: 34
Discharge: HOME | End: 2019-07-23
Payer: SELF-PAY

## 2019-07-23 VITALS — HEIGHT: 69 IN | BODY MASS INDEX: 26.36 KG/M2 | WEIGHT: 178 LBS

## 2019-07-23 DIAGNOSIS — F11.10: ICD-10-CM

## 2019-07-23 DIAGNOSIS — R10.32: Primary | ICD-10-CM

## 2019-07-23 DIAGNOSIS — F15.10: ICD-10-CM

## 2019-07-23 DIAGNOSIS — R11.0: ICD-10-CM

## 2019-07-23 PROCEDURE — 99284 EMERGENCY DEPT VISIT MOD MDM: CPT

## 2019-07-23 PROCEDURE — 74176 CT ABD & PELVIS W/O CONTRAST: CPT

## 2019-07-23 PROCEDURE — 85025 COMPLETE CBC W/AUTO DIFF WBC: CPT

## 2019-07-23 PROCEDURE — 80053 COMPREHEN METABOLIC PANEL: CPT

## 2019-07-23 PROCEDURE — 80307 DRUG TEST PRSMV CHEM ANLYZR: CPT

## 2019-07-23 PROCEDURE — 81003 URINALYSIS AUTO W/O SCOPE: CPT

## 2019-07-23 NOTE — DIAGNOSTIC IMAGING REPORT
EXAM: CT Abdomen and Pelvis WITHOUT contrast  

INDICATION:      

^[id number]

^[id number]    



COMPARISON: CT abdomen and pelvis 07/06/2019

TECHNIQUE: Abdomen and pelvis were scanned utilizing a multidetector helical

scanner from the lung base to the pubic symphysis without administration of IV

contrast. Absence of intravenous contrast decreases sensitivity for detection

of focal lesions and vascular pathology. Coronal and sagittal reformations were

obtained. Routine protocol was performed. 

            IV CONTRAST: None.

            ORAL CONTRAST: None

            RADIATION DOSE: Total DLP: 685.98 mGy*cm

             Estimated effective dose: (DLP x 0.015 x size factor) mSv

            COMPLICATIONS: None



FINDINGS:



LINES and TUBES: None.



LOWER THORAX:  Unremarkable



HEPATOBILIARY: Mild low diffuse attenuation of the liver parenchyma may reflect

early hepatic steatosis.  No focal hepatic lesions. No biliary ductal dilation.





GALLBLADDER: No radio-opaque stones or sludge.  No wall thickening.



SPLEEN: No splenomegaly. 



PANCREAS: No focal masses or ductal dilatation.  



ADRENALS: No adrenal nodules    



KIDNEYS/URETERS:  No hydronephrosis. No cystic or solid mass lesions.  No

stones.



GI TRACT: No abnormal distention, wall thickening, or evidence of bowel

obstruction.       Appendix is absent.



PELVIC ORGANS/BLADDER: Unremarkable.



LYMPH NODES: No lymphadenopathy.



VESSELS: Unremarkable.



PERITONEUM / RETROPERITONEUM: No free air or fluid.



BONES: Bone island in the left iliac bone. No acute bony abnormalities.



SOFT TISSUES: Unremarkable.            



IMPRESSION: 

No acute abnormality within the abdomen and pelvis.



Signed by: Dr. Crystal Pederson M.D. on 7/23/2019 7:21 PM

## 2019-07-23 NOTE — XMS REPORT
Clinical Summary

                             Created on: 2019



[place]

External Reference #: [id number]

: 1985

Sex: Male



Demographics







                          Address                   [address] 

[place]  [address]

 

                          Home Phone                [phone]

 

                          Preferred Language        English

 

                          Marital Status            Single

 

                          Yazidi Affiliation     [id number]

 

                          Race                      White

 

                          Ethnic Group              Non-





Author







                          Author                    [facility]

 

                          Organization              [facility]

 

                          Address                   Unknown

 

                          Phone                     Unavailable







Support







                Name            Relationship    Address         Phone

 

                Nini Cordova            Unknown         [phone]







Care Team Providers







                    Care Team Member Name    Role                Phone

 

                    Asked, No Pcp       PCP                 Unavailable







Allergies







                                        Comments



                 Active Allergy     Reactions       Severity        Noted Date 

 

                                        



Throat swelling



                     Dicyclomine         Swelling            2017 

 

                                         



                           Ketorolac                 2017 







Medications







                          End Date                  Status



              Medication     Sig          Dispensed     Refills      Start  



                                         Date  

 

                                                    Active



                     gabapentin (NEURONTIN)     Take 300 mg         0   



                           300 mg capsule            by mouth 2     



                                         (two) times a     



                                         day.     

 

                                                    Active



                     famotidine (PEPCID) 20 MG     Take 20 mg by       0   



                           tablet                    mouth 2 (two)     



                                         times a day.     







Active Problems







 



                           Problem                   Noted Date

 

 



                           Seizure                   2017

 

 



                           Convulsions               2017







Social History







                                        Date



                 Tobacco Use     Types           Packs/Day       Years Used 

 

                                         



                                         Smoker, Current Status    



                                         Unknown    









   



                 Alcohol Use     Drinks/Week     oz/Week         Comments

 

   



                                         No   









 



                           Sex Assigned at Birth     Date Recorded

 

 



                                         Not on file 









                                        Industry



                           Job Start Date            Occupation 

 

                                        Not on file



                           Not on file               Not on file 









                                        Travel End



                           Travel History            Travel Start 

 





                                         No recent travel history available.







Last Filed Vital Signs

Not on file



Plan of Treatment







   



                 Health Maintenance     Due Date        Last Done       Comments

 

   



                           INFLUENZA VACCINE         2019  







Results

Not on fileafter 2018



Advance Directives





Patient has advance care planning documents on file. For more information, martita tavares contact:



Michael Hendrickson



[address] [place] [address]

## 2019-07-23 NOTE — NUR
PT DENIED DRUG USE. WHEN DRUG SCREEN CAME BACK POSITIVE I QUESTIONED PATIENT 
AND HE STATED HE TOOK SOME PILLS HIS FRIEND GAVE HIM FOR MIGRAINES. PT STATED 
HE WAS CALLING THE POLICE TO FILE CHARGES ON HIS FRIEND, WHEN I ASKED HIM WHAT 
HIS CASE NUMBER WAS HE STATED HE DIDNT GET ONE.

## 2019-07-23 NOTE — XMS REPORT
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                    

 

                          Tramadol 50 Mg Tablet Ultram 50 Mg Tablet                            Take 1 tablet

 by mouth every 12 hours.                            Oral                            

Inactive                                                                             

                          2018                            [facility]                    

 

                          traMADol (ULTRAM) 50 mg tablet                            Take 1 tablet by mouth every

 12 hours.                            Oral                            Inactive       

                                                                             2018

                                        [facility]                    

 

                          Gabapentin 300 Mg Capsule                            Take 1 capsule by mouth 2 times

 daily.                            Oral                            Active            

                                                                            2018     

                                        [facility]                    

 

                          gabapentin (NEURONTIN) 300 mg capsule                            Take 1 capsule by

 mouth 2 times daily.                            Oral                            No Longer

 Active                                                                              

                          2018                            [facility]                    

 

                          Lisinopril 10 Mg Tablet Prinivil 10 Mg Tablet                            Take 1 tablet

 by mouth daily.                            Oral                            No Longer

 Active                                                                              

                          2018                            [facility]                    

 

                          Carvedilol 25 Mg Tablet Coreg 25 Mg Tablet                            Take 1 tablet

 by mouth 2 times daily (with meals).                            Oral              

                    No Longer Active                                                         

                           2018                            [facility]       

             

 

                          Furosemide 20 Mg Tablet Lasix 20 Mg Tablet                            Take 1 tablet

 by mouth daily.                            Oral                            Active   

                                                                                 2018

                                        [facility]                    

 

                          Tramadol 50 Mg Tablet Ultram 50 Mg Tablet                            Take 1 tablet

 by mouth every 12 hours.                            Oral                            

No Longer Active                                                                     

                          2018                            [facility]                   

 

 

                          Zolpidem 5 Mg Tablet Ambien 5 Mg Tablet                            Take 1 tablet by

 mouth nightly at bedtime as needed for Insomnia.                            Oral  

                          No Longer Active                                           

                                                2018                            [facility]

                    

 

                          lisinopril (PRINIVIL) 10 mg tablet                            Take 1 tablet by mouth

 daily.                            Oral                            No Longer Active  

                                                                                  2018

                                        [facility]                    

 

                          carvedilol (COREG) 25 mg tablet                            Take 1 tablet by mouth 

2 times daily (with meals).                            Oral                        

                    No Longer Active                                                                   

                          2018                            [facility]                 

   

 

                          furosemide (LASIX) 20 mg tablet                            Take 1 tablet by mouth 

daily.                            Oral                            Active             

                                                                            2018      

                                        [facility]                    

 

                          traMADol (ULTRAM) 50 mg tablet                            Take 1 tablet by mouth every

 12 hours.                            Oral                            No Longer Active

                                                                                    2018

                                        [facility]                    

 

                          zolpidem (AMBIEN) 5 mg Tab                            Take 1 tablet by mouth nightly

 at bedtime as needed for Insomnia.                            Oral                

                    No Longer Active                                                           

                          2018                            [facility]         

           

 

                          Tramadol 50 Mg Tablet Ultram 50 Mg Tablet                            Take 1 tablet

 by mouth daily as needed for Pain.                            Oral                

                    No Longer Active                                                           

                          2018                            [facility]         

           

 

                          traMADol (ULTRAM) 50 mg tablet                            Take 1 tablet by mouth daily

 as needed for Pain.                            Oral                            No Longer

 Active                                                                              

                          2018                            [facility]                    

 

                                        Divalproex 250 Mg Tablet,Delayed Release Depakote 250 Mg Tablet,Delayed Release 

                                        Take 1 tablet by mouth 2 times daily.                   

                    Oral                            No Longer Active                              

                                                      2018                     

                                        [facility]                    

 

                          divalproex (DEPAKOTE) 250 mg delayed release tablet                            Take

 1 tablet by mouth 2 times daily.                            Oral                  

                    No Longer Active                                                             

                          2018                            [facility]           

         

 

                          NAPROXEN SODIUM (ALEVE OR)                            Take by mouth.              

                          Oral                            No Longer Active                       

                                                                            03/15/2018                

                                        [facility]                    

 

                          Gabapentin 300 Mg Capsule                            Take 300 mg by mouth 2 times 

daily.                            Oral                            No Longer Active   

                                                                                 03/15/2018

                                        [facility]                    

 

                                        Amoxicillin 875 Mg-Potassium Clavulanate 125 Mg Tablet Augmentin 875 Mg-125 Mg Tablet

                                        Take 1 tablet by mouth 2 times daily for 10 days.      

                          Oral                            No Longer Active               

                                                                            03/15/2018        

                                        [facility]                    

 

                                        Benzonatate 100 Mg Capsule Tessalon Perles 100 Mg Capsule                       

                                        Take 2 capsules by mouth 3 times daily as needed for up to 7 days for Cough.  

                          Oral                            No Longer Active           

                                                                            03/15/2018    

                                        [facility]                    

 

                          Levetiracetam 1,000 Mg Tablet                            Take 1 tablet by mouth 2 

times daily.                            Oral                            Active       

                                                                             03/15/2018

                                        [facility]                    

 

                          Valproic Acid 250 Mg Capsule                            NONFORMTake 2 capsules by 

mouth 2 times daily.                            Oral                            No Longer

 Active                                                                              

                          03/15/2018                            [facility]                    

 

                          Tramadol 50 Mg Tablet Ultram 50 Mg Tablet                            Take 1 tablet

 by mouth daily as needed for Pain.                            Oral                

                    No Longer Active                                                           

                          03/15/2018                            [facility]         

           

 

                          Famotidine 20 Mg Tablet Pepcid 20 Mg Tablet                            Take 1 tablet

 by mouth 2 times daily.                            Oral                            Active

                                                                                    03/15/2018

                                        [facility]                    

 

                          Carvedilol 6.25 Mg Tablet Coreg 6.25 Mg Tablet                            Take 1 tablet

 by mouth 2 times daily (with meals).                            Oral              

                    No Longer Active                                                         

                           03/15/2018                            [facility]       

             

 

                          Hydrochlorothiazide 25 Mg Tablet                            Take 1 tablet by mouth

 daily as needed for Other (fluid retention).                            Oral      

                      Active                                                           

                          03/15/2018                            [facility]         

           

 

                          Lisinopril 10 Mg Tablet Prinivil 10 Mg Tablet                            Take 1 tablet

 by mouth daily.                            Oral                            No Longer

 Active                                                                              

                          03/15/2018                            [facility]                    

 

                          gabapentin (NEURONTIN) 300 mg capsule                            Take 1 capsule by

 mouth 2 times daily.                            Oral                            No Longer

 Active                                                                              

                          03/15/2018                            [facility]                    

 

                          Levetiracetam (KEPPRA) 1,000 mg tablet                            Take 1 tablet by

 mouth 2 times daily.                            Oral                            No Longer

 Active                                                                              

                          03/15/2018                            [facility]                    

 

                          famotidine (PEPCID) 20 mg tablet                            Take 1 tablet by mouth

 2 times daily.                            Oral                            No Longer 

Active                                                                               

                          03/15/2018                            [facility]                    

 

                          carvedilol (COREG) 6.25 mg tablet                            Take 1 tablet by mouth

 2 times daily (with meals).                            Oral                       

                    No Longer Active                                                                  

                          03/15/2018                            [facility]                

    

 

                          hydroCHLOROthiazide (HYDRODIURIL) 25 mg tablet                            Take 1 tablet

 by mouth daily as needed for Other (fluid retention).                            Oral

                            No Longer Active                                         

                                                03/15/2018                            [facility]                    

 

                          lisinopril (PRINIVIL) 10 mg tablet                            Take 1 tablet by mouth

 daily.                            Oral                            No Longer Active  

                                                                                  03/15/2018

                                        [facility]                    

 

                          Acetaminophen 300 Mg-Codeine 30 Mg Tablet                            TAKE ONE (1) 

TABLET(S) BY MOUTH EVERY SIX HOURS AS NEEDED FOR DENTAL PAIN.                   
                                                No Longer Active                                   

                                                 2018                            

[facility]                    

 

                          Levetiracetam 1,000 Mg Tablet                            Take 1,000 mg by mouth 2 

times daily.                            Oral                            No Longer Active

                                                                                    2018

                                        [facility]                    

 

                          Valproic Acid 250 Mg Capsule                            Take 500 mg by mouth 2 times

 daily.                            Oral                            No Longer Active  

                                                                                  2018

                                        [facility]                    

 

                          Lorazepam 1 Mg Tablet                            Take 1 mg by mouth 2 times daily 

as needed.                            Oral                            Active         

                                                                            2017  

                                        [facility]                    

 

                          LORazepam (ATIVAN) 1 mg tablet                            Take 1 mg by mouth 2 times

 daily as needed.                            Oral                            Active  

                                                                                  2017

                                        [facility]                    

 

                          Tramadol 50 Mg Tablet Ultram 50 Mg Tablet                            Take 1 tablet

 by mouth daily as needed for Pain.                            Oral                

                    No Longer Active                                                           

                          2017                            [facility]         

           

 

                          Famotidine 20 Mg Tablet Pepcid 20 Mg Tablet                            Take 1 tablet

 by mouth 2 times daily.                            Oral                            No

 Longer Active                                                                       

                          2016                            [facility]                    

 

                          Tramadol 50 Mg Tablet Ultram 50 Mg Tablet                            Take 1 tablet

 by mouth every 8 hours as needed for Pain.                            Oral        

                          No Longer Active                                                 

                                                2016                            [facility] 

                   

 

                          Carvedilol 6.25 Mg Tablet Coreg 6.25 Mg Tablet                            Take 1 tablet

 by mouth 2 times daily (with meals).                            Oral              

                    No Longer Active                                                         

                           2016                            [facility]       

             

 

                          Hydrochlorothiazide 25 Mg Tablet                            Take 1 tablet by mouth

 daily as needed for Other (fluid retention).                            Oral      

                          No Longer Active                                               

                                                2016                            [facility]

                    

 

                          Mirtazapine 15 Mg Tablet Remeron 15 Mg Tablet                            Take 1 tablet

 by mouth at bedtime nightly.                            Oral                      

                    No Longer Active                                                                 

                          10/26/2016                            [facility]               

     

 

                          Lisinopril 10 Mg Tablet Prinivil 10 Mg Tablet                            Take 1 tablet

 by mouth daily.                            Oral                            No Longer

 Active                                                                              

                          10/26/2016                            [facility]                    

 

                                        Loperamide 2 Mg Capsule Anti-Diarrheal (Loperamide) 2 Mg Capsule                

                                        Take 1 capsule by mouth 4 times daily as needed for Diarrhea.          

                          Oral                            No Longer Active                   

                                                                            2016            

                                        [facility]                    

 

                                        Sucralfate 100 Mg/Ml Oral Suspension Carafate 100 Mg/Ml Oral Suspension         

                                        Take 10 mL by mouth 4 times daily (before meals and nightly).   

                          Oral                            No Longer Active            

                                                                            2016     

                                        [facility]                    

 

                          Ibuprofen 800 Mg Tablet                            Take 1 tablet by mouth every 8 

hours as needed for Pain.                            Oral                            

No Longer Active                                                                     

                          2016                            [facility]                   

 

 

                          Acetaminophen 500 Mg Tablet                            Take 1 tablet by mouth every

 6 hours as needed for Pain.                            Oral                       

                    No Longer Active                                                                  

                          2015                            [facility]                

    

 

                          Diclofenac Sodium 75 Mg Tablet,Delayed Release                            Take 1 tablet

 by mouth 2 times daily as needed for Pain.                            Oral        

                          No Longer Active                                                 

                                                2015                            [facility] 

                   

 

                          Sennosides 8.6 Mg Tablet Senokot 8.6 Mg Tablet                            Take 1 tablet

 by mouth daily.                            Oral                            No Longer

 Active                                                                              

                          2015                            [facility]                    

 

                          Docusate Sodium 100 Mg Tablet                            Take by mouth 2 times daily.

                            Oral                            No Longer Active         

                                                                            2015  

                                        [facility]                    



                                                                                
                                                                                
                                                                                
                                                                                
                                                                                
                                                                                
                                                                                
                                                                                
                                                                                
                                                                                
                                                                                
                                                                                
                                                                                
                                                                                
                                                                                
                                                                                
                                    



Allergies, Adverse Reactions, Alerts

                    





                    Substance                            Category                            Reaction   

                          Severity                            Reaction type           

                          Status                            Date Reported                     

                          Comments                            Source                    

 

                    Dicyclomine                                                        Swelling         

                                                      Propensity to adverse reactions to drug

                            Active                            2015             

                                                      [facility]                    

 

                    Caffeine                                                        Other               

                                                      Propensity to adverse reactions to drug     

                          Active                            2015                  

                                                      [facility]                    

 

                    Ketorolac Tromethamine                                                        Other 

                                                       Propensity to adverse reactions

 to drug                            Active                            2015     

                                                      [facility]                    

 

                    Codeine                                                        Itching, Other       

                          High, High                            Propensity to adverse reactions

 to drug                            Active                            2015     

                                                      [facility]                    

 

                    Perflutren                                                        Cough, Other      

                          Medium, Medium                            Propensity to adverse

 reactions to drug                            Active                            2016

                                                        [facility]                

    

 

                    Ketorolac                                                                           

                                                      Propensity to adverse reactions to drug         

                          Active                            2017                      

                                                      [facility]                    



                                                                                
               



Immunizations

                    





                    Immunization                            Date Given                            Site  

                          Status                            Last Updated             

                          Comments                            Source                    

 

                          Influenza, Vaccine<FLUCELVAX>(Multi-Dose)                            10/09/2018   

                                                      Not Given                       

                                                Deferred: Patient Refused                            [facility]                    

 

                    Influenza Vaccine                            10/26/2016                             

                           Not Given                                                 

                          Deferred: Patient Refused                            [facility]            

        

 

                    Influenza Vaccine                            2016                             

                           Not Given                                                 

                          Deferred: Patient Refused                            [facility]            

        

 

                    Influenza Vaccine                            2016                             

                           Not Given                                                 

                          Deferred: Patient Refused                            [facility]            

        

 

                          Azithromycin 250mg Tab In Clinic                            2016            

                                                completed                            Bre

                                                        [facility]                

    

 

                    Influenza Vaccine                            2015                             

                           Not Given                                                 

                          Deferred: Patient Refused                            [facility]            

        

 

                    Influenza Vaccine                            2015                             

                           Not Given                                                 

                          Deferred: Patient Refused                            [facility]            

        

 

                          Tdap Tetanus, diphtheria, acellular pertussis Vaccine                            2013

                                                        completed                    

                                                                            [facility]          

          



                                                                                
                                                                           



Results







                    Order Name                            Results                            Value      

                          Reference Range                            Date                

                          Interpretation                            Comments                       

                                        Source                    

 

                          HEMOGLOBIN A1C                            <td ID="[id number]">Hemoglobin

 A1c</td><td>5.3</td><td>4.3 - 6.1 %</td><td&gt;[facility]</td><td
ID="[id number]"/>    5.3                            4.3 - 6.1     

                          2019                                                    

                                                      [facility]                    

 

                    HEMOGLOBIN A1C                            Est Average Gluc    105.4                 

                                                2019                                      

                                                      [facility]                    

 

                    VALPROIC ACID                            Valproic Acid       <10.0                     

                    50 - 100                            2019                                  

                                        Test performed on AJ7511 using EMIT Immunoassay              

                                        [facility]                    

 

                    VALPROIC ACID                            Lab Interpretation    Abnormal             

                                                2019                                  

                                                      [facility]                    

 

                FREE T4                            Free T4         0.75                            0.61 - 1.18

                            2019                                               

                                                      [facility]                    

 

                          TSH                            <td ID="[id number]">TSH</td><td>1.03</td><td>0.57

 - 3.74 uIU/mL</td><td>[facility]</td><td 
ID="[id number]"/>    1.03                            0.57 - 3.74  

                          2019                                                 

                                                      [facility]                    

 

                          BASIC METABOLIC PANEL                            <td ID="[id number]">CO2</td><td>29</td><td>21

 - 31 mmol/L</td><td>[facility]</td><td 
ID="[id number]"/>    29                            21 - 31        

                    2019                                                         

                                        [facility]                    

 

                          BASIC METABOLIC PANEL                            <td ID="[id number]">Chloride</td><td>103</td><td>98

 - 107 mmol/L</td><td&gt;[facility]</td><td 
ID="Nermeg617875748Ofqe0Upvphgage"/>    103                            98 - 107      

                          2019                                                     

                                                      [facility]                    

 

                          BASIC METABOLIC PANEL                            <td ID="[id number]">Potassium</td><td>4.7</td><td>3.5

 - 5.1 mmol/L</td><td&gt;[facility]</td><td 
ID="Fuzbrk685614675Lsml0Hvdwexpnm"/>    4.7                            3.5 - 5.1     

                          2019                                                    

                                                      [facility]                    

 

                          BASIC METABOLIC PANEL                            <td ID="Rjvakj200253929Bffv7Jwqm">Sodium</td><td>140</td><td>136

 - 145 mmol/L</td><td>[facility]</td><td 
ID="[id number]"/>    140                            136 - 145     

                          2019                                                    

                                                      [facility]                    

 

                          BASIC METABOLIC PANEL                            <td ID="[id number]">Glucose</td><td>92</td><td>70

 - 110 mg/dL</td><td>[facility]</td><td 
ID="[id number]"/>    92                            70 - 110       

                     2019                                                        

                                        [facility]                    

 

                          BASIC METABOLIC PANEL                            <td ID="[id number]">BUN</td><td>10</td><td>7

 - 25 mg/dL</td><td>[facility]</td><td 
ID="[id number]"/>    10                            7 - 25         

                    2019                                                          

                                        [facility]                    

 

                          BASIC METABOLIC PANEL                            <td ID="[id number]">Creatinine</td><td>1.00</td><td>0.7

 - 1.3 mg/dL</td><td&amp;gt;[facility]</td><td 
ID="Fvnqjm352097013Bggy8Xsracrcdm"/>    1.00                            0.7 - 1.3    

                          2019                                                   

                                                      [facility]                    

 

                    BASIC METABOLIC PANEL                            Anion Gap           8                     

                                                2019                                          

                                                      [facility]                    

 

                          BASIC METABOLIC PANEL                            <td ID="[id number]">Calcium</td><td>10.2</td><td>8.6

 - 10.3 mg/dL</td><td&gt;[facility]</td><td 
ID="Vjhztz453921689Umpt4Ypvtgerbw"/>    10.2                            8.6 - 10.3   

                          2019                                                  

                                                      [facility]                    

 

                    BASIC METABOLIC PANEL                            GFR, Estimated      >60              

                          mL/min/1.73 m2                            2019                   

                                                                            [facility]         

           

 

                    BASIC METABOLIC PANEL                            eGFR If Africn Am    >60           

                          mL/min/1.73 m2                            2019                

                                                                            [facility]      

              

 

                          LIVER PROFILE                            <td ID="[id number]">Protein,

 Total, Serum</td><td>7.1</td><td>6.0 - 8.3 g/dL</td>&lt;td>[facility] 
[facility]</td><td ID="Rbudap146158596Vbwe7Lyluivosj"/>    7.1                            6 - 

8.3                            2019                                            

                                                      [facility]                    

 

                          LIVER PROFILE                            <td ID="[id number]">Albumin</td><td>4.4</td><td>4.2

 - 5.5 g/dL</td><td>[facility]</td><td 
ID="Bomzfk365065893Mqee2Vuqltaash"/>    4.4                            4.2 - 5.5     

                          2019                                                    

                                                      [facility]                    

 

                          LIVER PROFILE                            <td ID="Caqrcv625927311Acxy2Iflu">Bilirubin,

 Total</td><td>0.3</td><td>0.2 - 1.2 mg/dL</td><td>[facility]</td><td 
ID="Gaawmy490942060Lxwt9Zyvdamiwm"/>    0.3                            0.2 - 1.2     

                          2019                                                    

                                                      [facility]                    

 

                          LIVER PROFILE                            <td ID="Btfqgq603592797Apis4Dpys">Alkaline

 Phosphatase, S</td><td>64</td><td>34 - 104 U/L</td>&lt;td>[facility] 
[facility]</td><td ID="Sjkzsv894633274Sctl0Ktvxkrdhy"/>    64                            34 - 

104                            2019                                            

                                                      [facility]                    

 

                          LIVER PROFILE                            <td ID="Txvrjx901439241Vzri6Huiw">AST (SGOT)</td><td>20</td><td>13

 - 39 U/L</td><td>[facility]</td><td ID="[id number]"/>
                    20                            13 - 39                            2019         

                                                                            [facility]

                    

 

                          LIVER PROFILE                            <td ID="[id number]">ALT</td><td>23</td><td>7

 - 52 U/L</td><td>[facility]</td><td ID="[id number]"/>
                    23                            7 - 52                            2019          

                                                                            [facility]

                    

 

                          LIVER PROFILE                            <td ID="Jekgij226299624Ikzv1Tmte">D Bilirubin</td><td>0.1</td><td>0.0

 - 0.2 mg/dL</td><td&amp;gt;[facility]</td><td 
ID="Zrvqfn173903588Zajb3Qvljehtoh"/>    0.1                            0 - 0.2       

                     2019                                                        

                                        [facility]                    

 

                          CBC/DIFF                            <td ID="[id number]">WBC</td><td>8.7</td><td>4.5

 - 12.0 K/uL</td><td>[facility]</td><td 
ID="Kangoc377273481Idub3Uzqyrzbgf"/>    8.7                            4.5 - 12      

                          2019                                                     

                                                      [facility]                    

 

                          CBC/DIFF                            <td ID="[id number]">RBC</td><td>5.97</td><td>4.60

 - 6.20 M/uL</td><td>[facility]</td><td 
ID="Rtvalq089767336Egat8Bsdbwfjnw"/>    5.97                            4.60 - 6.20  

                          2019                                                 

                                                      [facility]                    

 

                          CBC/DIFF                            <td ID="Ghwbif602911598Ckds7Dkyn">Hemoglobin</td><td>17.2</td><td>14.0

 - 18.0 g/dL</td><td&amp;gt;[facility]</td><td 
ID="Evnais116518853Hhlm0Lhgplsazp"/>    17.2                            14 - 18      

                          2019                                                     

                                                      [facility]                    

 

                          CBC/DIFF                            <td ID="Tvnkbo820937112Fwqi5Ascw">Hematocrit</td><td><span

 style="flagData">55.2</span><span style="flagData"> (H)</span></td><td>40.0 - 
54.0 %</td><td>[facility]</td><td ID="Oulwhx178495756Fpko4Hdcxarwxe"/>    55.2

                            40 - 54                            2019            

                                                                            [facility]  

                  

 

                          CBC/DIFF                            <td ID="Kqpghm977181034Hzrv5Tafv">MCV</td><td><span

 style="flagData">93</span><span style="flagData"> (H)</span></td><td>82 - 92 
fL</td><td>[facility]</td><td ID=&quot;[id number]"/>    93

                            82 - 92                            2019            

                                                                            [facility]  

                  

 

                          CBC/DIFF                            <td ID="Wwksti262919451Ifxx4Hrds">MCH</td><td>28.8</td><td>27.0

 - 31.0 pg</td><td>[facility]</td><td 
ID="Ncaoac101635602Thch3Ademdpizj"/>    28.8                            27 - 31      

                          2019                                                     

                                                      [facility]                    

 

                          CBC/DIFF                            <td ID="Dkoeph712332210Qpcu5Gisl">MCHC</td><td><span

 style="flagData">31.2</span><span style="flagData"> (L)</span></td><td>32.0 - 
36.0 g/dL</td><td>[facility]</td><td ID="Nkfjbq642134781Feud3Rlzvzlibm"/>
                    31.2                            32 - 36                            2019       

                                                                             [facility]

                    

 

                          CBC/DIFF                            <td ID="Azwdee914885162Gbld0Ulwj">RDW</td><td><span

 style="flagData">49.6</span><span style="flagData"> (H)</span></td><td>35.1 - 
43.9 fL</td><td>[facility]</td><td ID="Ovpput444780335Jyli2Jmdcltgzm"/>    49.6

                            35.1 - 43.9                            2019        

                                                                            [facility]

                    

 

                          CBC/DIFF                            <td ID="Kqhdib515501565Fzzn3Xeju">Platelets</td><td>336</td><td>150

 - 400 K/uL</td><td&gt;[facility] 2</td><td 
ID="Kevuos098368724Tqmg1Fgqawjwvy"/>    336                            150 - 400     

                          2019                                                    

                                                      [facility]                    

 

                          CBC/DIFF                            <td ID="Nkaxvi644940512Wbop73Dywi">Mean Platelet

 Volume</td><td>10.2</td><td>9.4 - 12.4 fL</td>&lt;td>[facility]</td><td 
ID="Wztohq575515010Yvza30Rvuomtxhn"/>    10.2                            9.4 - 12.4  

                          2019                                                 

                                                      [facility]                    

 

                CBC/DIFF                            Percent NRBC    0.0                               

                          2019                                                  

                                                      [facility]                    

 

                CBC/DIFF                            Absolute NRBC    0.00                             

                           2019                                                

                                                      [facility]                    

 

                          CBC/DIFF                            <td ID="[id number]">Neutrophils</td><td>52.9</td><td>34.0

 - 67.9 %</td><td&amp;gt;[facility] 2</td><td 
ID="Ewifvv334653481Yndq20Cswovsspt"/>    52.9                            34 - 67.9   

                          2019                                                  

                                                      [facility]                    

 

                          CBC/DIFF                            <td ID="Sknngw319727327Ttds48Iobx">Lymphs</td><td>33.2</td><td>21.8

 - 50.0 %</td><td>[facility] 2</td><td 
ID="Alazvf535630274Jbdi07Uskclefss"/>    33.2                            21.8 - 50   

                          2019                                                  

                                                      [facility]                    

 

                          CBC/DIFF                            <td ID="Wsdvvd553515514Makl59Ymhl">Monocytes</td><td>9.9</td><td>5.3

 - 12.0 %</td><td>[facility] 2</td><td 
ID="[id number]"/>    9.9                            5.3 - 12     

                          2019                                                    

                                                      [facility]                    

 

                          CBC/DIFF                            <td ID="[id number]">Eos</td><td>2.8</td><td>0.8

 - 5.0 %</td><td>[facility] 2</td><td ID="Nohhrv725807048Hszs57Sxjccscwm"/>
                    2.8                            0.8 - 5                            2019        

                                                                            [facility]

                    

 

                          CBC/DIFF                            <td ID="Jdcaqs739090523Zhnt31Lgeb">Basos</td><td>0.7</td><td>0.2

 - 1.2 %</td><td>[facility]</td><td ID="[id number]"/>
                    0.7                            0.2 - 1.2                            2019      

                                                                              [facility]

                    

 

                    CBC/DIFF                            Immature Granulocytes    0.5                    

                    0.0 - 0.5                            2019                                

                                                      [facility]                    



 

                    CBC/DIFF                            Neutrophils (Absolute)    4.59                  

                    1.78 - 5.36                            2019                            

                                                        [facility]                

    

 

                    CBC/DIFF                            Lymphs (Absolute)    2.88                       

                    1.32 - 3.57                            2019                                 

                                                      [facility]                    

 

                    CBC/DIFF                            Monocytes(Absolute)    0.86                     

                    0.3 - 0.82                            2019                                

                                                      [facility]                    



 

                CBC/DIFF                            Eos (Absolute)    0.24                            

0.04 - 0.54                            2019                                    

                                                      [facility]                    

 

                    CBC/DIFF                            Baso (Absolute)     0.06                         

                    0.01 - 0.08                            2019                                   

                                                      [facility]                    

 

                    CBC/DIFF                            Immature Grans (Abs)    0.04                    

                    0 - 0.03                            2019                                 

                                                      [facility]                    

 

                    CBC/DIFF                            Lab Interpretation    Abnormal                  

                                                2019                                       

                                                      [facility]                    

 

                          TRANSTHORACIC ECHO (TTE)                            TRANSTHORACIC ECHO (TTE) 

 Transthoracic 

 Echo Report 

 

 CARLITO ARISA 

 

 Age:32 Gender: M 

 

 :1985 

 

 MRN:[id number] 

 

 Exam Date: 2018

                                        13:34 

 

 Exam Location: [facility] Echo

 

 

 Ordering Phys: ARLENE CATES 

 

 Referring Phys:ARLENE CATES 

 

 Reading Phys:Tuan Farias MD 

 

 Fellow Phys: Micky Espana M.D. 

 

 Fellow Phys: 

 

 Sonographer: Shama Samano 

 

 Reason For Exam: 

 

 Indications:worsening symptoms of SOB, NYHA

 III, tachycardia 

 

 ICD-9 Codes: 

 

 Exam Type: TRANSTHORACIC ECHO (TTE) 

 

 Procedure CPT:72141 

 

 Addtional CPT: 

 

 Ht (in): 69 BSA: 1.95HR: 104 

 

 Rhythm: Sinus rhythm 

 

 Wt (lb): 171BP: 137/ 93 

 

 Technical Quality: Good 

 

 History: 

 

 MEASUREMENTS(Male / Female) Normal Values 

 2D ECHO 

 Body Surface Area 2 m 

 LV Diastolic Diameter PLAX4.7 cm4.2 - 5.9 / 3.9 - 5.3 cm 

 LV Systolic Diameter PLAX 3.2 cm2.1 - 4.0 cm 

 LV Fractional Shortening PLAX 32.1 %25 - 46% 

 IVS Diastolic Thickness 1.1 cm 

 IVS Systolic Thickness1.5 cm 

 LVPW Diastolic Thickness1.1 cm 

 LVPW Systolic Thickness 1.4 cm 

 LV Relative Wall Thickness0.46 

 LVOT Diameter 2.1 cm 

 Aortic Root Diameter3.4 cm 

 LV Diastolic Volume MOD BP93.7 cm67 - 155 / 56 - 104 cm 

 LV Systolic Volume MOD BP 43.7 cm22 - 58 / 19 - 49 cm 

 LV Ejection Fraction MOD BP 53.4 %>=55% 

 LV Stroke Volume MOD BP 50 cm 

 LV Cardiac Output MOD [id number] cm/min 

 LV Cardiac Index MOD BP 2662 cm/minm

 LA Volume 44.7 cm18 - 58 / 22 - 52 cm 

 LA Volume Index 22.9 cm/m 16 - 28 cm/m 

 

 DOPPLER 

 AV Peak Kohjoilx564 cm/s 

 AV Peak Gradient4.7 mmHg 

 AV Mean Tmlmhtxh15 cm/s

 AV Mean Gradient2.3 mmHg 

 AV Velocity Time Integral 17.8 cm

 LVOT Peak Jjfytwue46 cm/s

 LVOT Peak Gradient2.8 mmHg 

 LVOT Mean Zvxxivsp83.9 cm/s

 LVOT Mean Gradient1.6 mmHg 

 LVOT Velocity Time Integral 14.7 cm

 LVOT Stroke Gtltqe10.5 cm 

 AV Area Cont Eq vti 3 cm

 AV Area Cont Eq pk2.7 cm

 Mitral E Point Velocity 62 cm/s

 Mitral A Point Velocity 54 cm/s

 Mitral E to A Ratio 1.1

 MV Deceleration [place] 285 cm/s

 MV Deceleration Xors358 ms 

 PV Peak Bdmlieoi32.5 cm/s

 PV Peak Gradient3.5 mmHg 

 RVOT Peak Sjwkaemy92.4 cm/s

 RVOT Peak Gradient1.9 mmHg 

 LV E' Lateral Velocity9.2 cm/s 

 Mitral E to LV E' Lateral Ratio 6.8

 LV E' Septal Velocity 7 cm/s 

 Mitral E to LV E' Septal Ratio8.8

 

 

 FINDINGS 

 

 Left Ventricle 

 The left ventricle is normal in size. Upper-normal LV wall thickness. LV

 systolic function is low normal. LVEF is 50-54%. There are no regional wall

 motion abnormalities.There is normal LV relaxation with normal filling

 pressures. 

 

 Right Ventricle 

 The right ventricle is normal in size and systolic function. TAPSE=1.8 cm. 

 

 Right Atrium 

 The right atrium is normal in size. 

 

 Left Atrium 

 The left atrium is normal in size. 

 IAS 

 

 

 Mitral Valve 

 Structurally normal mitral valve without significant stenosis or prolapse.

 There is no mitral regurgitation. 

 

 Aortic Valve 

 The aortic valve is trileaflet and structurally normal. There is no aortic

 stenosis. There is no aortic regurgitation. 

 

 Tricuspid Valve 

 Structurally normal tricuspid valve without significant stenosis. There is

 trace tricuspid regurgitation.Insufficient TR jet to estimate pulmonary

 artery systolic pressure. 

 

 Pulmonic Valve 

 Structurally normal pulmonic valve without significant stenosis. There is trace

 pulmonic regurgitation. 

 

 Pericardium 

 No pericardial effusion. 

 

 Aorta 

 Normal aortic root for body surface area. 

 

 IVC 

 The inferior vena cava is normal in size. 

 

 CONCLUSIONS 

 The left ventricle is normal in size. Upper-normal LV wall thickness.

 LV systolic function is low normal. LVEF is 50-54%.

 There are no regional wall motion abnormalities. 

 There is normal LV relaxation with normal filling pressures. 

 The right ventricle is normal in size and systolic function. 

 Atria are normal size. 

 No significant valvular abnormalities.

 Insufficient TR jet to estimate pulmonary artery systolic pressure.

 No pericardial effusion. 

 

 When compared to prior study from 2016, the LVEF is improved, no regional

 wall motion abnormalities are identified

 

 Tuan Farias MD 

 (Electronically Signed) 

 Final Date:2018

                                        17:11 

 

 2D ECHO 

 Body Surface Area 2 m 

 LV Diastolic Diameter PLAX4.7 cm4.2 - 5.9 / 3.9 - 5.3 cm 

 LV Systolic Diameter PLAX 3.2 cm2.1 - 4.0 cm 

 LV Fractional Shortening PLAX 32.1 %25 - 46% 

 IVS Diastolic Thickness 1.1 cm 

 IVS Systolic Thickness1.5 cm 

 LVPW Diastolic Thickness1.1 cm 

 LVPW Systolic Thickness 1.4 cm 

 LV Relative Wall Thickness0.46 

 LVOT Diameter 2.1 cm 

 Aortic Root Diameter3.4 cm 

 LV Diastolic Volume MOD BP93.7 cm67 - 155 / 56 - 104 cm 

 LV Systolic Volume MOD BP 43.7 cm22 - 58 / 19 - 49 cm 

 LV Ejection Fraction MOD BP 53.4 %>=55% 

 LV Stroke Volume MOD BP 50 cm 

 LV Cardiac Output MOD XP5451 cm/min 

 LV Cardiac Index MOD BP 2662 cm/minm

 LA Volume 44.7 cm18 - 58 / 22 - 52 cm 

 LA Volume Index 22.9 cm/m 16 - 28 cm/m 

 

 DOPPLER 

 AV Peak Mpvwrxre787 cm/s 

 AV Peak Gradient4.7 mmHg 

 AV Mean Fkichywr98 cm/s

 AV Mean Gradient2.3 mmHg 

 AV Velocity Time Integral 17.8 cm

 LVOT Peak Gkjiqyrd99 cm/s

 LVOT Peak Gradient2.8 mmHg 

 LVOT Mean Dzvrifda39.9 cm/s

 LVOT Mean Gradient1.6 mmHg 

 LVOT Velocity Time Integral 14.7 cm

 LVOT Stroke Uaweja94.5 cm 

 AV Area Cont Eq vti 3 cm

 AV Area Cont Eq pk2.7 cm

 Mitral E Point Velocity 62 cm/s

 Mitral A Point Velocity 54 cm/s

 Mitral E to A Ratio 1.1

 MV Deceleration [place] 285 cm/s

 MV Deceleration Spzr137 ms 

 PV Peak Nxcgkxvj46.5 cm/s

 PV Peak Gradient3.5 mmHg 

 RVOT Peak Yhtuqswm39.4 cm/s

 RVOT Peak Gradient1.9 mmHg 

 LV E' Lateral Velocity9.2 cm/s 

 Mitral E to LV E' Lateral Ratio 6.8

 LV E' Septal Velocity 7 cm/s 

 Mitral E to LV E' Septal Ratio8.8                                                 

                    2018                                                                      

                                        [facility]                    

 

                TSH                            TSH             0.41                            0.57 - 3.74     

                          07/10/2018                                                    

                                                      [facility]                    

 

                    TSH                            Lab Interpretation    Abnormal                       

                                                07/10/2018                                            

                                                      [facility]                    

 

                    HEMOGLOBIN A1C                            Hemoglobin A1c      5.5                     

                    4.3 - 6.1                            07/10/2018                                 

                                                      [facility]                    

 

                    HEMOGLOBIN A1C                            Est Average Gluc    111.2                 

                                                07/10/2018                                      

                                                      [facility]                    

 

                BASIC METABOLIC PANEL                            CO2             26                            

21 - 31                            07/10/2018                                        

                                                      [facility]                    

 

                    BASIC METABOLIC PANEL                            Chloride            103                    

                    98 - 107                            07/10/2018                                 

                                                      [facility]                    

 

                    BASIC METABOLIC PANEL                            Potassium           4.8                   

                    3.5 - 5.1                            07/10/2018                               

                                                      [facility]                   

 

 

                    BASIC METABOLIC PANEL                            Sodium              137                      

                    136 - 145                            07/10/2018                                  

                                                      [facility]                    

 

                    BASIC METABOLIC PANEL                            Glucose             110                     

                    70 - 110                            07/10/2018                                  

                                                      [facility]                    

 

                    BASIC METABOLIC PANEL                            Urea Nitrogen       11                

                    7 - 25                            07/10/2018                               

                                                      [facility]                   

 

 

                    BASIC METABOLIC PANEL                            Creatinine          0.90                 

                    0.7 - 1.3                            07/10/2018                             

                                                       [facility]                 

   

 

                    BASIC METABOLIC PANEL                            Anion Gap           8                     

                                                07/10/2018                                          

                                                      [facility]                    

 

                    BASIC METABOLIC PANEL                            Calcium             10.3                    

                    8.6 - 10.3                            07/10/2018                               

                                                      [facility]                   

 

 

                    BASIC METABOLIC PANEL                            GFR, Estimated      >60              

                          mL/min/1.73 m2                            07/10/2018                   

                                                                            [facility]         

           

 

                    BASIC METABOLIC PANEL                            GFR, Estim, Afr-Am    >60          

                          mL/min/1.73 m2                            07/10/2018               

                                                                            [facility]     

               

 

                          LIPID PROFILE                            <td ID="[id number]">Cholesterol</td><td><span>184</span><br/><span

 style=&quot;allIndent"><span style="cellHeader">Comment: </span><br/><span 
ID="[id number]" style="pre">REFERENCE RANGE:<br/>Desirable: 
<200 mg/dL<br/>Borderline: 200-240 mg/dL<br/>High Risk: >240 mg/dL<br/&amp;gt; 
<br/></span></span></td><td>mg/dL</td><td>BT MAIN-STATION 1</td><td ID=&amp
;quot;[id number]"/>    184                                        

                    07/10/2018                                                        REFERENCE

 RANGE:<br/>Desirable: <200 mg/dL<br/>Borderline: 200-240 mg/dL<br/>High Risk: 
>240 mg/dL<br/> <br/>                            [facility]                    

 

                          LIPID PROFILE                            <td ID="[id number]">Triglyceride</td><td><span>70</span><br/><span

 style=&quot;allIndent"><span style="cellHeader">Comment: </span><br/><span 
ID="[id number]" style="pre">REFERENCE RANGE:<br/>Normal: <150 
mg/dL<br/>Borderline High: 150-199 mg/dL<br/>High: 200-499 mg/dL<br/>Very High: 
>pj=056 mg/dL<br/> <br/></span></span></td><td><150 mg/dL</td><td&gt;BT MAIN-
STATION 1</td><td ID="[id number]"/>    70                       

                    <150                            07/10/2018                                        

                                        REFERENCE RANGE:<br/>Normal: <150 mg/dL<br/>Borderline High: 150-199

 mg/dL<br/>High: 200-499 mg/dL<br/>Very High: >zb=424 mg/dL<br/> <br/>          
                                        [facility]                    

 

                          LIPID PROFILE                            <td ID="[id number]">HDL</td><td><span>51</span><br/><span

 style="allIndent"><span style="cellHeader">Comment: </span><br/><span 
ID="[id number]" style=&amp;quot;pre">Increased CHD risk: <40 
mg/dL<br/>Decreased CHD risk: >60 mg/dL<br/> <br/></span></span>&am
p;lt;/td><td>mg/dL</td><td>BT MAIN-STATION 1</td><td 
ID="[id number]"/>    51                                           

                    07/10/2018                                                        Increased

 CHD risk: <40 mg/dL<br/>Decreased CHD risk: >60 mg/dL<br/> <br/>               
                                        [facility]                    

 

                          LIPID PROFILE                            <td ID="[id number]">LDL</td><td><span>119</span><br/><span

 style="allIndent"><span style="cellHeader">Comment: </span><br/><span 
ID="[id number]" style=&amp;quot;pre">REFERENCE 
RANGE:<br/>Optimal: <100 mg/dL<br/>Near Optimal: 100-129 mg/dL<br/>Borderline 
High: 130-159 mg/dL<br/&gt;High: 160-189 mg/dL<br/>Very High: >wp=189 mg/dL<br/>
<br/></span></span></td><td>mg/dL</td><td>BT MAIN-STATION 1</td><td 
ID="[id number]"/>    119                                          

                    07/10/2018                                                        REFERENCE

 RANGE:<br/>Optimal: <100 mg/dL<br/>Near Optimal: 100-129 mg/dL<br/>Borderline 
High: 130-159 mg/dL<br/>High: 160-189 mg/dL<br/>Very High: >wz=875 mg/dL<br/> 
<br/>                                   [facility]                    

 

                LIVER PROFILE                            T Protein       7.2                            6

 - 8.3                            07/10/2018                                         

                                                      [facility]                    

 

                LIVER PROFILE                            Albumin         4.4                            4.2

 - 5.5                            07/10/2018                                         

                                                      [facility]                    

 

                    LIVER PROFILE                            T Bilirubin         0.4                         

                    0.2 - 1.2                            07/10/2018                                     

                                                      [facility]                    

 

                LIVER PROFILE                            Alk Phos        69                            34 

- 104                            07/10/2018                                          

                                                      [facility]                    

 

                LIVER PROFILE                            AST             17                            13 - 39 

                           07/10/2018                                                

                                                      [facility]                    

 

                LIVER PROFILE                            ALT             22                            7 - 52  

                          07/10/2018                                                 

                                                      [facility]                    

 

                    LIVER PROFILE                            D Bilirubin         0.1                         

                    0 - 0.2                            07/10/2018                                       

                                                      [facility]                    

 

                CBC/DIFF                            WBC             12.1                            4.5 - 12   

                          07/10/2018                                                  

                                                      [facility]                    

 

                CBC/DIFF                            RBC             5.85                            4.60 - 6.20

                            07/10/2018                                               

                                                      [facility]                    

 

                CBC/DIFF                            Hemoglobin      17.5                            14 -

 18                            07/10/2018                                            

                                                      [facility]                    

 

                CBC/DIFF                            Hematocrit      52.5                            40 -

 54                            07/10/2018                                            

                                                      [facility]                    

 

                CBC/DIFF                            MCV             90                            82 - 92      

                          07/10/2018                                                     

                                                      [facility]                    

 

                CBC/DIFF                            MCH             29.9                            27 - 31    

                          07/10/2018                                                   

                                                      [facility]                    

 

                CBC/DIFF                            MCHC            33.3                            32 - 36   

                          07/10/2018                                                  

                                                      [facility]                    

 

                CBC/DIFF                            RDW             44.4                            35.1 - 43.9

                            07/10/2018                                               

                                                      [facility]                    

 

                CBC/DIFF                            Platelet        386                            150 - 400

                            07/10/2018                                               

                                                      [facility]                    

 

                    CBC/DIFF                            Mean Platelet Volume    10.0                    

                    9.4 - 12.4                            07/10/2018                               

                                                      [facility]                   

 

 

                CBC/DIFF                            Percent NRBC    0.0                               

                          07/10/2018                                                  

                                                      [facility]                    

 

                CBC/DIFF                            Absolute NRBC    0.00                             

                           07/10/2018                                                

                                                      [facility]                    

 

                CBC/DIFF                            Neutrophil      75.5                            34 -

 67.9                            07/10/2018                                          

                                                      [facility]                    

 

                CBC/DIFF                            Lymphocyte      16.1                            21.8

 - 50                            07/10/2018                                          

                                                      [facility]                    

 

                CBC/DIFF                            Monocyte        6.3                            5.3 - 12

                            07/10/2018                                               

                                                      [facility]                    

 

                CBC/DIFF                            Eosinophil      1.2                            0.8 -

 5                            07/10/2018                                             

                                                      [facility]                    

 

                CBC/DIFF                            Basophil        0.5                            0.2 - 1.2

                            07/10/2018                                               

                                                      [facility]                    

 

                CBC/DIFF                            Pct Immat Gran    0.4                            0.0

 - 0.5                            07/10/2018                                         

                                                      [facility]                    

 

                    CBC/DIFF                            Neutrophil, Abs     9.14                         

                    1.78 - 5.36                            07/10/2018                                   

                                                      [facility]                    

 

                    CBC/DIFF                            Lymphocyte, Abs     1.95                         

                    1.32 - 3.57                            07/10/2018                                   

                                                      [facility]                    

 

                CBC/DIFF                            Monocyte, Abs    0.76                            0.3

 - 0.82                            07/10/2018                                        

                                                      [facility]                    

 

                    CBC/DIFF                            Eosinophil, Abs     0.15                         

                    0.04 - 0.54                            07/10/2018                                   

                                                      [facility]                    

 

                CBC/DIFF                            Basophil, Abs    0.06                            0.01

 - 0.08                            07/10/2018                                        

                                                      [facility]                    

 

                    CBC/DIFF                            Absol Immat Gran    0.05                        

                    0 - 0.03                            07/10/2018                                     

                                                      [facility]                    

 

                    CBC/DIFF                            Lab Interpretation    Abnormal                  

                                                07/10/2018                                       

                                                      [facility]                    

 

                    12 LEAD EKG                            12 LEAD EKG FOR [facility]



 

 Test Date:2018

Pat Name: CARLITO ARIAS Department: 

Patient ID: [id number]: 

Gender: MTechnician: [id number]

:1985 Requested By: 

Order Number:Reading MD: Lucas Santiago M.D.

 Measurements

IntervalsAxis

Rate: 92 P:65

KY: 128QRS:78

QRSD: 74 T:49

QT: 334

QTc:413

 Interpretive Statements

Normal sinus rhythm

Normal ECG

 

Electronically Signed On 18 17:05:02 CDT by Lucas Santiago M.D.

                                                        2018                   

                                                                            [facility]         

           



                                                                                
                                                                                
                                                                                
                                                                                
                                                                                
                                                                                
                                                                                
                                                                                
                                                                                
                                                                                
                                                                                
                                       



Pathology Reports







                                        No Data Provided for This Section                    



                                                



Diagnostic Reports

                    





                    Report                            Value                            Date             

                                        Source                    

 

                          XRAY CHEST 2 VIEWS                            IMPRESSION: 1.Age-indeterminate wedge-

shaped compression deformities of the T7 andT8 vertebral bodies, new since 
2016.2.Lungs are clear.  Dictated By: Darin Denny DO, 2019 1:23 
PM I have reviewed the study and agree with the findings in this report. Signed 
By: Crystal De La Cruz MD, 2019 1:56 PM EXAM: XRAY CHEST 2 VIEWS DATE:
2019 10:06 AM INDICATION: Sob on exertion, h/o CHF COMPARISON: Chest x-ray 
2/3/2016 FINDINGS: Devices, Lines, and Tubes: None. Heart and Mediastinum: No 
abnormalities. Lungs and Pleura: Clear. Bones and Soft Tissues: Age-
indeterminate wedge-shaped compressiondeformities of the T7 and T8 vertebral 
bodies with with 40% height loss,and increased thoracic kyphosis. Cortical 
anchor screws in the righthumeral head.  Interface, Rad/Mammog In - 2019  
2:01 PM CSTEXAM: XRAY CHEST 2 VIEWS

DATE: 2019 10:06 AM

INDICATION: Sob on exertion, h/o CHF

COMPARISON: Chest x-ray 2/3/2016

FINDINGS: 

Devices, Lines, and Tubes: None.

Heart and Mediastinum: No abnormalities.

Lungs and Pleura: Clear.

Bones and Soft Tissues: Age-indeterminate wedge-shaped compression

deformities of the T7 and T8 vertebral bodies with with 40% height loss,

and increased thoracic kyphosis. Cortical anchor screws in the right

humeral head. 

IMPRESSION

IMPRESSION: 

                                        1.  Age-indeterminate wedge-shaped compression deformities of the T7 and

T8 vertebral bodies, new since 2016.

                                        2.  Lungs are clear. 

Dictated By: Darin Denny DO, 2019 1:23 PM

I have reviewed the study and agree with the findings in this report.

Signed By: Crystal De La Cruz MD, 2019 1:56 PM

                            2019                            [facility]      

              



                                                                    



Consultation Notes

                    





                                        No Data Provided for This Section                    



                                                            



Discharge Summaries

                    





                                        No Data Provided for This Section                    



                                                            



History and Physicals

                    





                                        No Data Provided for This Section                    



                                                                



Vital Signs

                     





                    Vital Sign                            Value                            Date         

                          Comments                            Source                    

 

                    Systolic (mm Hg)                            117                             2019

                                                        [facility]                

    

 

                    Diastolic (mm Hg)                            76                             2019

                                                        [facility]                

    

 

                    Heart Rate                            109                             2019    

                                                      [facility]                    



 

                          Temperature Oral (F)                            36.83 Any                         

                    2019                                                        [facility]     

               

 

                    Respitory Rate                            18                             2019 

                                                       [facility]                 

   

 

                    Height                            175.3 cm                            2019    

                                                      [facility]                    



 

                    Weight                            81.92                             2019      

                                                      [facility]                    

 

                    Systolic (mm Hg)                            108                             10/09/2018

                                                        [facility]                

    

 

                    Diastolic (mm Hg)                            71                             10/09/2018

                                                        [facility]                

    

 

                    Heart Rate                            85                             10/09/2018     

                                                      [facility]                    

 

                          Temperature Oral (F)                            36.61 Any                         

                    10/09/2018                                                        [facility]     

               

 

                    Respitory Rate                            18                             10/09/2018 

                                                       [facility]                 

   

 

                    Height                            175.3 cm                            10/09/2018    

                                                      [facility]                    



 

                    Weight                            82.01                             10/09/2018      

                                                      [facility]                    

 

                    BMI Calculated                            26.70                             10/09/2018

                                                        [facility]                

    

 

                    Systolic (mm Hg)                            124                             2018

                                                        [facility]                

    

 

                    Diastolic (mm Hg)                            81                             2018

                                                        [facility]                

    

 

                    Heart Rate                            121                             2018    

                                                      [facility]                    



 

                          Temperature Oral (F)                            36.89 Any                         

                    2018                                                        [facility]     

               

 

                    Respitory Rate                            18                             2018 

                                                       [facility]                 

   

 

                    Height                            175.3 cm                            2018    

                                                      [facility]                    



 

                    Weight                            77.928                             2018     

                                                      [facility]                    

 

                    BMI Calculated                            25.37                             2018

                                                        [facility]                

    



                                                                                
                                                                                
                                                                                
                                                                                
                                                                                
                                   



Encounters

                    





                    Location                            Location Details                            Encounter

 Type                            Encounter Number                            Reason For

 Visit                            Attending Provider                            ADM Date

                            DC Date                            Status                

                                        Source                    

 

                    [facility]                                                        Refill

                            [id number]                            RLQ abdominal pain Right

 upper quadrant abdominal pain                            Arlene Cates MD           

                    2018                                                            

                                        [facility]                                                        Refill

                            [id number]                            Heart palpitations Chronic

 intractable headache, unspecified headache type Combined systolic and diastolic
congestive heart failure, unspecified congestive heart failure chronicity Sinus 
tachycardia Right upper quadrant abdominal pain RLQ abdominal pain              
                          Arlene Cates MD                            2018                    

                                                                            [facility]          

          

 

                    Pharmacy [facility]                                                        Pharmacy 

Visit                            [phone]                                           

                                                  2018                                  

                                                      [facility]                    

 

                    Pharmacy [facility]                                                        Pharmacy 

Visit                            [id number]                                           

                                                  2018                                  

                                                      [facility]                    

 

                    Pharmacy [facility]                                                        Pharmacy 

Visit                            [id number]                                           

                                                  03/15/2018                                  

                                                      [facility]                                                        Office

 Visit                            [phone]                            [facility] health

 care Acute non-recurrent maxillary sinusitis Cough Combined systolic and 
diastolic congestive heart failure, unspecified congestive heart failure 
chronicity Sinus tachycardia Seizures Gastroesophageal reflux disease without 
esophagitis Pain in thoracic spine at multiple sites                            Arlene Cates MD                            03/15/2018                            03/15/2018 

                                                       [facility]                 

   

 

                    Pharmacy [facility]                                                        Pharmacy 

Visit                            [phone]                                           

                                                  2018                                  

                                                      [facility]                                                        Orders

 Only                            [id number]                            Seizure        

                          Angelica Thompson [facility]                            2018         

                                                                            [facility]

                    

 

                    Pharmacy [facility]                                                        Pharmacy 

Visit                            [phone]                                           

                                                  2018                                  

                                                      [facility]                    

 

                    Pharmacy [facility]                                                        Pharmacy Visit

                            [id number]                                                

                                                  2018                                       

                                                      [facility]                    

 

                    Pharmacy [facility]                                                        Pharmacy 

Visit                            [phone]                                           

                                                  2018                                  

                                                      [facility]                                                        Refill

                            [id number]                            Acute non-recurrent 

maxillary sinusitis Cough                            Dylan Tolentino MD              

                    2018                                                               

                                        [facility]                                        

                          Pharmacy Visit                            [id number]                  

                                                                              2018         

                                                                            [facility]

                    

 

                    Pharmacy [facility]                                                        Pharmacy 

Visit                            [phone]                                           

                                                  2018                                  

                                                      [facility]                                        

                          Pharmacy Visit                            [id number]                  

                                                                              2018         

                                                                            [facility]

                    

 

                    Pharmacy [facility]                                                        Pharmacy 

Visit                            [phone]                                           

                                                  2018                                  

                                                      [facility]                                        

                          Pharmacy Visit                            [id number]                  

                                                                              2018         

                                                                            [facility]

                    

 

                    Pharmacy [facility]                                                        Pharmacy 

Visit                            [phone]                                           

                                                  04/10/2018                                  

                                                      [facility]                                        

                          Pharmacy Visit                            [id number]                  

                                                                              04/10/2018         

                                                                            [facility]

                    

 

                    Pharmacy [facility]                                                        Pharmacy 

Visit                            [phone]                                           

                                                  2018                                  

                                                      [facility]                    

 

                    Pharmacy [facility]                                                        Pharmacy 

Visit                            [phone]                                           

                                                  2018                                  

                                                      [facility]                                                        Refill

                            [id number]                            Pain in thoracic spine

 at multiple sites                            Dylan Tolentino MD                     

                    2018                                                                      

                                        [facility]                                        

                          Pharmacy Visit                            [id number]                  

                                                                              2018         

                                                                            [facility]                                                        Refill

                            [phone]                            Pain in thoracic spine

 at multiple sites                            Dylan Tolentino MD                     

                    2018                                                                      

                                        [facility]                    

 

                    Pharmacy [facility]                                                        Pharmacy 

Visit                            [phone]                                           

                                                  2018                                  

                                                      [facility]                    

 

                    Pharmacy [facility]                                                        Pharmacy 

Visit                            [phone]                                           

                                                  2018                                  

                                                      [facility]                    

 

                    Pharmacy [facility]                                                        Pharmacy 

Visit                            [phone]                                           

                                                  2018                                  

                                                      [facility]                    

 

                    Pharmacy [facility]                                                        Pharmacy 

Visit                            [id number]                                           

                                                  2018                                  

                                                      [facility]                                        

                          Pharmacy Visit                            [id number]                  

                                                                              2018         

                                                                            [facility]

                    

 

                    Pharmacy [facility]                                                        Pharmacy 

Visit                            [id number]                                           

                                                  2018                                  

                                                      [facility]                    

 

                    Pharmacy [facility]                                                        Pharmacy 

Visit                            [id number]                                           

                                                  2018                                  

                                                      [facility]                                                        Office

 Visit                            [id number]                                          

                          Arlene Cates MD                            2018                                                        [facility]

                    

 

                    Laboratory [facility]                                                        Lab Appointment

                            [id number]                                                

                          Arlene Cates MD                            2018                                                        [facility]     

               

 

                    Pharmacy [facility]                                                        Pharmacy 

Visit                            [id number]                                           

                                                  2018                                  

                                                      [facility]                                                        Refill

                            [id number]                                                

                          Dylan Tolentino MD                            2018                       

                                                                            [facility]             

       

 

                    Pharmacy [facility]                                                        Pharmacy 

Visit                            [id number]                                           

                                                  2018                                  

                                                      [facility]                                        

                          Pharmacy Visit                            [phone]                  

                                                                              2018         

                                                                            [facility]

                    

 

                    Pharmacy [facility]                                                        Pharmacy 

Visit                            [id number]                                           

                                                  2018                                  

                                                      [facility]                                                        Hospital

 Encounter                            [id number]                                      

                          Arlene Cates MD                            2018                                                        [facility]                                                        Refill

                            [id number]                                                

                          Luz Mitchell RN                            2018                     

                                                                            [facility]           

         

 

                    Pharmacy [facility]                                                        Pharmacy 

Visit                            [phone]                                           

                                                  2018                                  

                                                      [facility]                    

 

                    Pharmacy [facility]                                                        Pharmacy 

Visit                            [id number]                                           

                                                  2018                                  

                                                      [facility]                    

 

                    Pharmacy [facility]                                                        Pharmacy 

Visit                            [id number]                                           

                                                  2018                                  

                                                      [facility]                                        

                          Pharmacy Visit                            [id number]                  

                                                                              10/02/2018         

                                                                            [facility]

                    

 

                    Pharmacy [facility]                                                        Pharmacy 

Visit                            [id number]                                           

                                                  10/02/2018                                  

                                                      [facility]                    

 

                    Pharmacy [facility]                                                        Pharmacy 

Visit                            [id number]                                           

                                                  10/04/2018                                  

                                                      [facility]                    

 

                    Pharmacy [facility]                                                        Pharmacy 

Visit                            [id number]                                           

                                                  10/09/2018                                  

                                                      [facility]                                                        Office

 Visit                            [id number]                                          

                          Arlene Cates MD                            10/09/2018                   

                    10/09/2018                                                        [facility]

                    

 

                    Pharmacy [facility]                                                        Pharmacy 

Visit                            [id number]                                           

                                                  10/10/2018                                  

                                                      [facility]                    

 

                    Pharmacy [facility]                                                        Pharmacy 

Visit                            [id number]                                           

                                                  2018                                  

                                                      [facility]                                                        Refill

                            [id number]                                                

                          Luz Mitchell RN                            2018                     

                                                                            [facility]           

         

 

                    Family Practice [facility]                                                        Refill

                            [id number]                                                

                          Arlene Cates MD                            2018                         

                                                                            [facility]               

     

 

                                                                            Travel                      

                    [phone]                                                                        

                    2019                                                               

                                        [facility]                                                        Office Visit 

                           [id number]                                                 

                    Arlene Cates MD                            2019                                                        [facility]                                                        Orders

 Only                            [id number]                                           

                          Arlene Cates MD                            2019                    

                                                                            [facility]                                                        Office

 Visit                            [id number]                                          

                          Arlene Cates MD                            2019                                                        [facility]

                    

 

                    Laboratory [facility]                                                        Lab Appointment

                            [id number]                                                

                          Arlene Cates MD                            2019                                                        [facility]     

               

 

                    Radiology [facility]                                                        Ancillary

 Procedure                            [id number]                                      

                          Arlene Cates MD                            2019                                                        [facility]                                                        Refill

                            [id number]                                                

                          Arlene Cates MD                            2019                         

                                                                            [facility]                                                        Refill

                            [id number]                                                

                          Dylan Tolentino MD                            2019                       

                                                                            [facility]                                                        Refill

                            [id number]                                                

                          Arlene Cates MD                            2019                         

                                                                            [facility]                                                        Telephone

                            [phone]                                                

                          Ahmet Mcnamara                             2019                       

                                                                            [facility]             

       

 

                                                                            Travel                      

                    [phone]                                                                        

                    2019                                                               

                                        [facility]                                                        Orders

 Only                            [id number]                                           

                          Arlene Cates MD                            2019                    

                                                                            [facility]                                                        Office

 Visit                            [id number]                                          

                          Arlene Cates MD                            2019                                                        [facility]                                                        Refill

                            [id number]                                                

                          Arlene Cates MD                            2019                         

                                                                            [facility]               

     

 

                    EKG (5400) BT                                                        Hospital Encounter

                            [id number]                                                

                          Arlene Cates MD                            2019                         

                                                                            [facility]               

     



                                                                                
                                                                                
                                                                                
                                                                                
                                                                                
                                                                                
                                                                                
                                                                                
                                                                                
                                                                                
                                                      



Procedures

                    





                    Procedure                            Code                            Date           

                          Perfomer                            Comments                        

                                        Source                    

 

                    XRAY CHEST 2 VIEWS                            36821                            2019

                            [facility]                    

 

                    VALPROIC ACID                            [id number]                            2019

                            [facility]                    

 

                    LIVER PROFILE                            97859                            2019

                            [facility]                    

 

                    BASIC METABOLIC PANEL                            75049                            2019

                            [facility]                    

 

                    CBC/DIFF                            59358                            2019     

                       [facility]                    

 

                    HEMOGLOBIN A1C                            36457                            2019

                            [facility]                    

 

                    FREE T4                            13131                            2019      

                      [facility]                    

 

                          THYROID STIMULATING HORMONE (TSH)                            00318                

                    2019                            [facility]                    

 

                    PULMONARY FUNCTION TEST                            91704                            

02/15/2019                            [facility]                    

 

                          TRANSTHORACIC ECHO (TTE)                            39355                         

                    2018                            [facility]                    

 

                          TRANSTHORACIC ECHO (TTE)                            05633                         

                    2018                            [facility]                    

 

                    LIPID PROFILE                            84662                            2018

                            [facility]                    

 

                    LIVER PROFILE                            88960                            2018

                            [facility]                    

 

                    BASIC METABOLIC PANEL                            01512                            2018

                            [facility]                    

 

                    CBC/DIFF                            34284                            2018     

                       [facility]                    

 

                    HEMOGLOBIN A1C                            61616                            2018

                            [facility]                    

 

                          THYROID STIMULATING HORMONE (TSH)                            57025                

                    2018                            [facility]                    

 

                    LIPID PROFILE                            07623                            2018

                            [facility]                    

 

                    LIVER PROFILE                            28245                            2018

                            [facility]                    

 

                    BASIC METABOLIC PANEL                            28068                            2018

                            [facility]                    

 

                    CBC/DIFF                            35206                            2018     

                       [facility]                    

 

                    HEMOGLOBIN A1C                            68992                            2018

                            [facility]                    

 

                    TSH                            63032                            2018          

                    [facility]

                    

 

                    12 LEAD EKG                            88603                            2018  

                          [facility]                    

 

                    12 LEAD EKG                            20622                            2018  

                          [facility]                    



                                                                                
                                                                                
                                                                                
                                                                                
                                                                                
                                       



Assessment and Plan

                    





                                        No Data Provided for This Section                    



                                    



Plan of Care







                    Plan of Care        Date                Source

 

                    Not on file                            2019                            [facility]                    

 

                                        Upcoming EncountersDateTypeSpecialtyCare TeamDescription

                                        01/10/2019

Office Visit

Neurology

ref #: [id number]

                            2018                            [facility]      

              

 

                                        Upcoming EncountersDateTypeSpecialtyCare TeamDescription

                                        10/09/2018

Office Visit

Family Practice

Arlene Cates  [address] 
[phone] (Fax)

[facility]

                            10/04/2018                            [facility]      

              



                                                                                
                       



Social History

                    





                    Social History                            Date                            Source    

                

 

                                        Tobacco UseTypesPacks/DayYears UsedDate

Light Tobacco Smoker

Cigarettes

                                        0.5

                                        10



Smokeless Tobacco: Never Used



Tobacco Cessation: Ready to Quit: Yes; Counseling Given: Yes

Comments: trying to quit

Alcohol UseDrinks/Weekoz/WeekComments

No

                                        0 Standard drinks or equivalent

                                        0.0

none ETOH for four years (2016)

Food InsecurityAnswerDate Recorded

Within the past 12 months, you worried that your food would run out before you 
got money to buy more.

Sometimes true

                                        2019

Within the past 12 months, the food you bought just didn't last and you didn't 
have money to get more.

Sometimes true

                                        2019

Sex Assigned at BirthDate Recorded

Not on file



Job Start DateOccupationIndustry

Not on file

Travel HistoryTravel StartTravel End

No recent travel history available.

                            2019                            [facility]      

              



                                                                        



Family History

                    





                    Value                            Date                            Source             

       

 

                                        Medical HistoryRelationNameComments

Hypertension

Mother



Other

Mother

migraine, depression

RelationNameStatusComments

Brother

Alive



Father

Alive



Maternal Grandfather





Maternal Grandmother





Mother

Alive



Paternal Grandfather





Paternal Grandmother

Alive



Sister

Alive



Sister

Alive



Sister

Alive



Son

Alive



                            2019                            [facility]      

              

 

                                        Medical HistoryRelationNameComments

Hypertension

Mother



Other

Mother

migraine, depression

RelationNameStatusComments

Brother

Alive



Father

Alive



Maternal Grandfather





Maternal Grandmother





Mother

Alive



Paternal Grandfather





Paternal Grandmother

Alive



Sister

Alive



Sister

Alive



Sister

Alive



Son

Alive



                            2018                            [facility]      

              

 

                                        Medical HistoryRelationNameComments

Hypertension

Mother



Other

Mother

migraine, depression

RelationNameStatusComments

Brother

Alive



Father

Alive



Maternal Grandfather





Maternal Grandmother





Mother

Alive



Paternal Grandfather





Paternal Grandmother

Alive



Sister

Alive



Sister

Alive



Sister

Alive



Son

Alive



                            10/04/2018                            [facility]      

              



                                                                                
                   



Advance Directives

                    





                                        No Data Provided for This Section                    



                                                            



Functional Status

                    





                                        No Data Provided for This Section